# Patient Record
Sex: FEMALE | Race: WHITE | Employment: UNEMPLOYED | URBAN - METROPOLITAN AREA
[De-identification: names, ages, dates, MRNs, and addresses within clinical notes are randomized per-mention and may not be internally consistent; named-entity substitution may affect disease eponyms.]

---

## 2022-06-10 ENCOUNTER — OFFICE VISIT (OUTPATIENT)
Dept: INTERNAL MEDICINE | Age: 37
End: 2022-06-10

## 2022-06-10 VITALS
HEART RATE: 118 BPM | OXYGEN SATURATION: 96 % | HEIGHT: 64 IN | WEIGHT: 118 LBS | DIASTOLIC BLOOD PRESSURE: 80 MMHG | BODY MASS INDEX: 20.14 KG/M2 | SYSTOLIC BLOOD PRESSURE: 110 MMHG | TEMPERATURE: 97.2 F

## 2022-06-10 DIAGNOSIS — N61.0 MASTITIS, LEFT, ACUTE: Primary | ICD-10-CM

## 2022-06-10 PROCEDURE — 99202 OFFICE O/P NEW SF 15 MIN: CPT | Performed by: NURSE PRACTITIONER

## 2022-06-10 RX ORDER — CEPHALEXIN 500 MG/1
500 CAPSULE ORAL 4 TIMES DAILY
Qty: 28 CAPSULE | Refills: 0 | Status: SHIPPED | OUTPATIENT
Start: 2022-06-10 | End: 2022-06-17

## 2022-06-10 ASSESSMENT — ENCOUNTER SYMPTOMS
WHEEZING: 0
COUGH: 0
COLOR CHANGE: 1
FACIAL SWELLING: 0
SHORTNESS OF BREATH: 0
TROUBLE SWALLOWING: 0

## 2022-06-10 NOTE — PROGRESS NOTES
Subjective:      Patient ID: Chester Langston is a 29 y.o. female who presents today for:  Chief Complaint   Patient presents with    Other     mastitis from breastfeeding       Rash  This is a new problem. The current episode started 1 to 4 weeks ago. The problem has been waxing and waning since onset. Location: left breast. The rash is characterized by redness and pain. Associated with: currently breastfeeding. Pertinent negatives include no cough, facial edema, fatigue, fever or shortness of breath. Treatments tried: increased breastfeeding/pumping. The treatment provided no relief. History reviewed. No pertinent past medical history. History reviewed. No pertinent surgical history. History reviewed. No pertinent family history. Allergies   Allergen Reactions    Pcn [Penicillins] Hives     Possible hives/rash on her chest.         Review of Systems   Constitutional: Negative for chills, fatigue and fever. HENT: Negative for facial swelling and trouble swallowing. Respiratory: Negative for cough, shortness of breath and wheezing. Cardiovascular: Negative for chest pain. Musculoskeletal: Negative for myalgias. Skin: Positive for color change. Negative for rash. Objective:   /80 (Site: Left Upper Arm, Position: Sitting, Cuff Size: Small Adult)   Pulse (!) 118   Temp 97.2 °F (36.2 °C)   Ht 5' 4\" (1.626 m)   Wt 118 lb (53.5 kg)   SpO2 96%   BMI 20.25 kg/m²     Physical Exam  Vitals reviewed. Constitutional:       General: She is not in acute distress. Appearance: She is well-developed. HENT:      Head: Normocephalic. Cardiovascular:      Rate and Rhythm: Regular rhythm. Tachycardia present. Heart sounds: Normal heart sounds. Pulmonary:      Effort: Pulmonary effort is normal. No respiratory distress. Breath sounds: Normal breath sounds. Chest:   Breasts:      Left: No swelling or mass. Comments: Mild erythema and tenderness.   Musculoskeletal: General: Normal range of motion. Skin:     General: Skin is warm and dry. Neurological:      Mental Status: She is alert and oriented to person, place, and time. Assessment:       Diagnosis Orders   1. Mastitis, left, acute  cephALEXin (KEFLEX) 500 MG capsule         Plan:      No orders of the defined types were placed in this encounter. Orders Placed This Encounter   Medications    cephALEXin (KEFLEX) 500 MG capsule     Sig: Take 1 capsule by mouth 4 times daily for 7 days     Dispense:  28 capsule     Refill:  0     Reviewed supportive measures for symptom management. Medication administration and side effects were discussed. Reviewed symptoms requiring ER. Patient verbalizes understanding. I have reviewed and updated the electronic medical record. Return in 3 days (on 6/13/2022), or if symptoms worsen or fail to improve, for follow up with PCP.     KRYSTA Rich NP

## 2022-06-10 NOTE — PATIENT INSTRUCTIONS
Patient Education        Mastitis: Care Instructions  Your Care Instructions  Mastitis is an inflammation of the breast. It occurs most often in women who are breastfeeding, but it can affect any woman. Mastitis can be caused by poor milk flow from the breast. When milk builds up in a breast, it leaks into the nearby breast tissue. Infection can also develop when the nipples becomecracked or irritated. The tissue can then become infected with bacteria. Antibiotics can usually cure mastitis. For women who are nursing, continued breastfeeding (or pumping) can help. If mastitis is not treated, a pocket ofpus may form in the breast and need to be drained. Follow-up care is a key part of your treatment and safety. Be sure to make and go to all appointments, and call your doctor if you are having problems. It's also a good idea to know your test results and keep alist of the medicines you take. How can you care for yourself at home?  If your doctor prescribed antibiotics, take them as directed. Do not stop taking them just because you feel better. You need to take the full course of antibiotics.  If you are breastfeeding, continue breastfeeding or pumping breast milk. It is important to empty your breasts regularly, every 2 to 3 hours while you are awake. These tips may help:  ? Before breastfeeding, place a warm, wet washcloth over your breast for about 15 minutes. Try this at least 3 times a day. This increases milk flow in the breast. Massaging the affected breast may also increase milk flow. ? Breastfeed on both sides. Try to start with your healthy breast first. Then, after your milk is flowing, breastfeed from the affected breast until it feels soft. You should empty this breast completely. Then switch back to the healthy breast and breastfeed until your baby has finished. ? Pump or hand-express a small amount of breast milk before breastfeeding if your breasts are too full with milk.  This will make your breasts less full and may make it easier for your baby to latch on to your breast.  ? Pump or express milk from the affected breast if it hurts too much to breastfeed.  Take an over-the-counter pain medicine, such as acetaminophen (Tylenol) or ibuprofen (Advil, Motrin) to relieve pain and fever. Read and follow all instructions on the label.  Do not take two or more pain medicines at the same time unless the doctor told you to. Many pain medicines have acetaminophen, which is Tylenol. Too much acetaminophen (Tylenol) can be harmful.  Rest as much as possible.  Drink extra fluids.  If pus is draining from your infected breast, wash the nipple gently and let it air-dry before you put your bra back on. A disposable breast pad placed in the bra cup may absorb the pus.  Sometimes, a blocked nipple pore, called a milk blister (or bleb) happens. This causes milk to back up in the breast. It can cause mastitis, so it's important to treat this if it happens. A milk blister is often a white dot on your nipple that can be painful. If a milk blister is causing you pain, it may help to place a warm, wet washcloth over the blister before breastfeeding or pumping. If this doesn't clear the blockage, your doctor can open the blister using a sterile needle. When should you call for help? Call your doctor now or seek immediate medical care if:     You have worse symptoms of a breast infection, such as:  ? Increased pain, swelling, redness, or warmth around a breast.  ? Red streaks leading from a breast.  ? Pus draining from a breast.  ? A fever. Watch closely for changes in your health, and be sure to contact your doctor if:     You do not get better as expected. Where can you learn more? Go to [email or web address] [email or web address] and sign in to your [employer] account. Enter Y207 in the [employer] box to learn more about \"Mastitis: Care Instructions. \"     If you do not have an account, please click on the \"Sign Up Now\" link. Current as of: June 16, 2021               Content Version: 13.2  © 2006-2022 [employer] Inc[employer]. Care instructions adapted under license by [facility] [place] ([facility]). If you have questions about a medical condition or this instruction, always ask your healthcare professional. [place] [address] any warranty or liability for your use of this information.

## 2024-05-06 PROBLEM — Z87.59 HISTORY OF GESTATIONAL HYPERTENSION: Status: ACTIVE | Noted: 2024-05-06

## 2024-05-06 PROBLEM — Z86.711 HISTORY OF PULMONARY EMBOLISM: Status: ACTIVE | Noted: 2024-05-06

## 2024-05-06 PROBLEM — Z34.90 PRENATAL CARE, ANTEPARTUM (HHS-HCC): Status: ACTIVE | Noted: 2024-05-06

## 2024-05-06 PROBLEM — O09.529 ANTEPARTUM MULTIGRAVIDA OF ADVANCED MATERNAL AGE (HHS-HCC): Status: ACTIVE | Noted: 2024-05-06

## 2024-05-07 ENCOUNTER — INITIAL PRENATAL (OUTPATIENT)
Dept: OBSTETRICS AND GYNECOLOGY | Facility: CLINIC | Age: 39
End: 2024-05-07
Payer: COMMERCIAL

## 2024-05-07 VITALS — WEIGHT: 114.4 LBS | SYSTOLIC BLOOD PRESSURE: 118 MMHG | DIASTOLIC BLOOD PRESSURE: 78 MMHG | BODY MASS INDEX: 20.27 KG/M2

## 2024-05-07 DIAGNOSIS — O09.529 ANTEPARTUM MULTIGRAVIDA OF ADVANCED MATERNAL AGE (HHS-HCC): ICD-10-CM

## 2024-05-07 DIAGNOSIS — Z34.90 PRENATAL CARE, ANTEPARTUM (HHS-HCC): ICD-10-CM

## 2024-05-07 DIAGNOSIS — Z87.59 HISTORY OF GESTATIONAL HYPERTENSION: ICD-10-CM

## 2024-05-07 DIAGNOSIS — O21.9 NAUSEA AND VOMITING IN PREGNANCY PRIOR TO 22 WEEKS GESTATION (HHS-HCC): ICD-10-CM

## 2024-05-07 DIAGNOSIS — Z86.711 HISTORY OF PULMONARY EMBOLISM: Primary | ICD-10-CM

## 2024-05-07 DIAGNOSIS — Z88.0 PENICILLIN ALLERGY: ICD-10-CM

## 2024-05-07 DIAGNOSIS — Z3A.01 6 WEEKS GESTATION OF PREGNANCY (HHS-HCC): ICD-10-CM

## 2024-05-07 LAB
CREAT UR-MCNC: 31.1 MG/DL (ref 20–320)
PROT UR-ACNC: <4 MG/DL (ref 5–24)
PROT/CREAT UR: ABNORMAL MG/G{CREAT}

## 2024-05-07 PROCEDURE — 0500F INITIAL PRENATAL CARE VISIT: CPT | Performed by: STUDENT IN AN ORGANIZED HEALTH CARE EDUCATION/TRAINING PROGRAM

## 2024-05-07 PROCEDURE — 84156 ASSAY OF PROTEIN URINE: CPT

## 2024-05-07 PROCEDURE — 87661 TRICHOMONAS VAGINALIS AMPLIF: CPT

## 2024-05-07 PROCEDURE — 87800 DETECT AGNT MULT DNA DIREC: CPT

## 2024-05-07 PROCEDURE — 87086 URINE CULTURE/COLONY COUNT: CPT

## 2024-05-07 PROCEDURE — 82570 ASSAY OF URINE CREATININE: CPT

## 2024-05-07 RX ORDER — PYRIDOXINE HCL (VITAMIN B6) 25 MG
25 TABLET ORAL EVERY 6 HOURS PRN
Qty: 60 TABLET | Refills: 1 | Status: SHIPPED | OUTPATIENT
Start: 2024-05-07 | End: 2024-07-06

## 2024-05-07 ASSESSMENT — EDINBURGH POSTNATAL DEPRESSION SCALE (EPDS)
TOTAL SCORE: 6
THINGS HAVE BEEN GETTING ON TOP OF ME: NO, MOST OF THE TIME I HAVE COPED QUITE WELL
I HAVE LOOKED FORWARD WITH ENJOYMENT TO THINGS: AS MUCH AS I EVER DID
I HAVE BLAMED MYSELF UNNECESSARILY WHEN THINGS WENT WRONG: NOT VERY OFTEN
THE THOUGHT OF HARMING MYSELF HAS OCCURRED TO ME: NEVER
I HAVE BEEN ANXIOUS OR WORRIED FOR NO GOOD REASON: YES, SOMETIMES
I HAVE BEEN ABLE TO LAUGH AND SEE THE FUNNY SIDE OF THINGS: AS MUCH AS I ALWAYS COULD
I HAVE BEEN SO UNHAPPY THAT I HAVE BEEN CRYING: NO, NEVER
I HAVE FELT SCARED OR PANICKY FOR NO GOOD REASON: YES, SOMETIMES
I HAVE BEEN SO UNHAPPY THAT I HAVE HAD DIFFICULTY SLEEPING: NOT AT ALL
I HAVE FELT SAD OR MISERABLE: NO, NOT AT ALL

## 2024-05-07 NOTE — PROGRESS NOTES
Subjective   Patient ID [id number]   Nancy Contreras is a 38 y.o.  @ 6.3 wga by certain LMP presenting for new OB visit. Pregnancy planned and desired. Had recent miscarriage in January. LMP certain. Some cramping, no bleeding. Some nausea. FOB aware and involved.    Her pregnancy is notable for:  -gHTN in last pregnancy  -h/o PE on OCPs    OBHx:  x1, SAB x2  GynHx: denies STIs or abnormal paps  PMHx: as above  SurgHx: denies   Meds: PNV  Allergies: PCN (hives at age 13)  Social: denies t/e/I  FHx: reviewed and non-contributory to presenting complaint    OB History    Para Term  AB Living   1             SAB IAB Ectopic Multiple Live Births                  # Outcome Date GA Lbr David/2nd Weight Sex Delivery Anes PTL Lv   1 Current                   Objective   Physical Exam  Weight: 51.9 kg (114 lb 6.4 oz)  Expected Total Weight Gain: 11.5 kg (25 lb)-16 kg (35 lb)   Pregravid BMI: 20.38  TVUS: single viable IUP with CRL measuring 6.4wga and +FCA    Assessment/Plan     Nancy Contreras is a 38 y.o.  at  @ 6.3 wga by certain LMP presenting for new OB visit.    Single viable IUP measuring 6.4wga on bedside ultrasound today. Taking PNV. B6 and unisom prescribed for nausea. UCx, GC/CT/trich, and baseline P:C today. Pap UTD. PNLs including baseline HELLP labs and early A1c next visit. Patient is AMA. Discussed genetics and carrier testing - desires cfDNA, reports negative carrier testing in last pregnancy so will not repeat. PCN allergy as a child, referred for allergy testing. History of PE while on OCPs. Saw MFM last pregnancy and was recommended for at least PPx lovenox which she declined. Reviewed recommendation for lovenox this pregnancy. Patient desires to discuss with  first but will consider this pregnancy. Reviewed strong recommendation for COVID vaccination in pregnancy, patient will consider. Is a candidate for ASA PPx. To start at 12 weeks. Discussed today, will re-discuss  next visit. Pre-pregnancy BMI = 20, for 25-35 lb total gestational weight gain. Has a cat, counseled not to clean litter box. Oriented to practice, collaborative [facility]. Remainder of plan per problem list below.     Problem List Items Addressed This Visit       Antepartum multigravida of advanced maternal age ([facility])    Overview     -Desires cfDNA and NT         Relevant Orders    Urine Culture    C. Trachomatis / N. Gonorrhoeae, Amplified Detection    Trichomonas vaginalis, Nucleic Acid Detection    Protein, Urine Random    US MAC OB imaging order    History of gestational hypertension    Overview     -Baseline HELLP labs and P:C to be sent with PNLs  -For ASA PPx         History of pulmonary embolism - Primary    Overview     -In 2008 while on OCPs  -No FHx of VTE  -Thrombophilia workup last pregnancy notable only for mildly low protein S, no follow-up completed  -s/p MFM consult last pregnancy, was recommended for at least PPx lovenox which she ultimately declined   -Re-counseled, considering PPx dosing this pregnancy  -If declines lovenox, will send to MFM for re-consult. If starting lovenox, will be for growth in third trimester         Penicillin allergy    Overview     -Hives at age 13  -Allergy referral placed         Relevant Orders    Referral to Allergy    Prenatal care, antepartum ([facility])    Overview     -Counseled on updated COVID vaccination  -Last pap NIL/HRHPV neg (11/2021)    Next Visit  [ ] PNLs including A1c and baseline HELLP labs  [ ] cfDNA  [ ] Remind to start ASA PPx  [ ] follow-up lovenox  [ ] Confirm NT scheduled          Other Visit Diagnoses       6 weeks gestation of pregnancy ([facility])        Nausea and vomiting in pregnancy prior to 22 weeks gestation ([facility])        Relevant Medications    doxylamine (Unisom, doxylamine,) 25 mg tablet    pyridoxine (Vitamin B-6) 25 mg tablet          RTC in 4 weeks.    Aliza Franklin MD

## 2024-05-08 LAB
BACTERIA UR CULT: NO GROWTH
C TRACH RRNA SPEC QL NAA+PROBE: NEGATIVE
N GONORRHOEA DNA SPEC QL PROBE+SIG AMP: NEGATIVE
T VAGINALIS RRNA SPEC QL NAA+PROBE: NEGATIVE

## 2024-06-04 ENCOUNTER — LAB (OUTPATIENT)
Dept: LAB | Facility: LAB | Age: 39
End: 2024-06-04
Payer: COMMERCIAL

## 2024-06-04 ENCOUNTER — ROUTINE PRENATAL (OUTPATIENT)
Dept: OBSTETRICS AND GYNECOLOGY | Facility: CLINIC | Age: 39
End: 2024-06-04
Payer: COMMERCIAL

## 2024-06-04 VITALS — BODY MASS INDEX: 20.73 KG/M2 | WEIGHT: 117 LBS | DIASTOLIC BLOOD PRESSURE: 66 MMHG | SYSTOLIC BLOOD PRESSURE: 103 MMHG

## 2024-06-04 DIAGNOSIS — Z34.90 PRENATAL CARE, ANTEPARTUM (HHS-HCC): ICD-10-CM

## 2024-06-04 DIAGNOSIS — Z86.711 HISTORY OF PULMONARY EMBOLISM: Primary | ICD-10-CM

## 2024-06-04 DIAGNOSIS — R73.9 ELEVATED BLOOD SUGAR: Primary | ICD-10-CM

## 2024-06-04 DIAGNOSIS — Z87.59 HISTORY OF GESTATIONAL HYPERTENSION: ICD-10-CM

## 2024-06-04 DIAGNOSIS — Z3A.10 10 WEEKS GESTATION OF PREGNANCY (HHS-HCC): ICD-10-CM

## 2024-06-04 DIAGNOSIS — O09.529 ANTEPARTUM MULTIGRAVIDA OF ADVANCED MATERNAL AGE (HHS-HCC): ICD-10-CM

## 2024-06-04 LAB
ABO GROUP (TYPE) IN BLOOD: NORMAL
ALBUMIN SERPL BCP-MCNC: 4.2 G/DL (ref 3.4–5)
ALP SERPL-CCNC: 55 U/L (ref 33–110)
ALT SERPL W P-5'-P-CCNC: 12 U/L (ref 7–45)
ANION GAP SERPL CALC-SCNC: 11 MMOL/L (ref 10–20)
ANTIBODY SCREEN: NORMAL
AST SERPL W P-5'-P-CCNC: 14 U/L (ref 9–39)
BILIRUB SERPL-MCNC: 1.2 MG/DL (ref 0–1.2)
BUN SERPL-MCNC: 11 MG/DL (ref 6–23)
CALCIUM SERPL-MCNC: 9 MG/DL (ref 8.6–10.3)
CHLORIDE SERPL-SCNC: 102 MMOL/L (ref 98–107)
CO2 SERPL-SCNC: 26 MMOL/L (ref 21–32)
CREAT SERPL-MCNC: 0.59 MG/DL (ref 0.5–1.05)
EGFRCR SERPLBLD CKD-EPI 2021: >90 ML/MIN/1.73M*2
ERYTHROCYTE [DISTWIDTH] IN BLOOD BY AUTOMATED COUNT: 15.4 % (ref 11.5–14.5)
EST. AVERAGE GLUCOSE BLD GHB EST-MCNC: 117 MG/DL
GLUCOSE SERPL-MCNC: 80 MG/DL (ref 74–99)
HBA1C MFR BLD: 5.7 %
HBV SURFACE AG SERPL QL IA: NONREACTIVE
HCT VFR BLD AUTO: 37.7 % (ref 36–46)
HCV AB SER QL: NONREACTIVE
HGB BLD-MCNC: 12.2 G/DL (ref 12–16)
HIV 1+2 AB+HIV1 P24 AG SERPL QL IA: NONREACTIVE
LDH SERPL L TO P-CCNC: 135 U/L (ref 84–246)
MCH RBC QN AUTO: 28.5 PG (ref 26–34)
MCHC RBC AUTO-ENTMCNC: 32.4 G/DL (ref 32–36)
MCV RBC AUTO: 88 FL (ref 80–100)
NRBC BLD-RTO: 0 /100 WBCS (ref 0–0)
PLATELET # BLD AUTO: 241 X10*3/UL (ref 150–450)
POTASSIUM SERPL-SCNC: 3.7 MMOL/L (ref 3.5–5.3)
PROT SERPL-MCNC: 7.2 G/DL (ref 6.4–8.2)
RBC # BLD AUTO: 4.28 X10*6/UL (ref 4–5.2)
REFLEX ADDED, ANEMIA PANEL: NORMAL
RH FACTOR (ANTIGEN D): NORMAL
RUBV IGG SERPL IA-ACNC: 1.3 IA
RUBV IGG SERPL QL IA: POSITIVE
SODIUM SERPL-SCNC: 135 MMOL/L (ref 136–145)
TREPONEMA PALLIDUM IGG+IGM AB [PRESENCE] IN SERUM OR PLASMA BY IMMUNOASSAY: NONREACTIVE
URATE SERPL-MCNC: 3 MG/DL (ref 2.3–6.7)
WBC # BLD AUTO: 11.5 X10*3/UL (ref 4.4–11.3)

## 2024-06-04 PROCEDURE — 86901 BLOOD TYPING SEROLOGIC RH(D): CPT

## 2024-06-04 PROCEDURE — 87389 HIV-1 AG W/HIV-1&-2 AB AG IA: CPT

## 2024-06-04 PROCEDURE — 36415 COLL VENOUS BLD VENIPUNCTURE: CPT

## 2024-06-04 PROCEDURE — 83036 HEMOGLOBIN GLYCOSYLATED A1C: CPT

## 2024-06-04 PROCEDURE — 0501F PRENATAL FLOW SHEET: CPT | Performed by: STUDENT IN AN ORGANIZED HEALTH CARE EDUCATION/TRAINING PROGRAM

## 2024-06-04 PROCEDURE — 86850 RBC ANTIBODY SCREEN: CPT

## 2024-06-04 PROCEDURE — 83021 HEMOGLOBIN CHROMOTOGRAPHY: CPT

## 2024-06-04 PROCEDURE — 86780 TREPONEMA PALLIDUM: CPT

## 2024-06-04 PROCEDURE — 80053 COMPREHEN METABOLIC PANEL: CPT

## 2024-06-04 PROCEDURE — 86317 IMMUNOASSAY INFECTIOUS AGENT: CPT

## 2024-06-04 PROCEDURE — 85027 COMPLETE CBC AUTOMATED: CPT

## 2024-06-04 PROCEDURE — 87340 HEPATITIS B SURFACE AG IA: CPT

## 2024-06-04 PROCEDURE — 84550 ASSAY OF BLOOD/URIC ACID: CPT

## 2024-06-04 PROCEDURE — 86900 BLOOD TYPING SEROLOGIC ABO: CPT

## 2024-06-04 PROCEDURE — 86803 HEPATITIS C AB TEST: CPT

## 2024-06-04 PROCEDURE — 83615 LACTATE (LD) (LDH) ENZYME: CPT

## 2024-06-04 RX ORDER — ASPIRIN 81 MG/1
162 TABLET ORAL DAILY
Qty: 180 TABLET | Refills: 2 | Status: SHIPPED | OUTPATIENT
Start: 2024-06-04 | End: 2025-06-04

## 2024-06-04 NOTE — PROGRESS NOTES
Subjective   Patient ID [id number]   Nancy Contreras is a 38 y.o.  at 10w3d with a working estimated date of delivery of 2024, by Last Menstrual Period who presents for a routine prenatal visit. Doing well. Some nausea, has not needed meds. No cramping or bleeding.     Objective   Physical Exam  Vitals:    24 1139   BP: 103/66      Weight: 53.1 kg (117 lb), Pregravid BMI: 20.38  Expected Total Weight Gain: 11.5 kg (25 lb)-16 kg (35 lb)   Fundal height and FHR per prenatal flowsheet    Assessment/Plan     Nancy Contreras is a 38 y.o.  at 10w3d with a working estimated date of delivery of 2024, by Last Menstrual Period who presents for a routine prenatal visit.    PNLs including baseline HELLP labs and A1c today. Reminded to start ASA PPx at 12wga. Desires cfDNA, ordered. Reminded to schedule NT. Again discussed recommendation for PPx lovenox this pregnancy given history of PE while on OCPs. Patient discussed with  and declines. Recommend repeat MFM consult given lovenox declination - patient states would not change her decision and declined MFM consult. Will have low threshold to evaluate for VTE should she develop symptoms. Remainder of plan per problem list as below.     Problem List Items Addressed This Visit       Prenatal care, antepartum ([facility])    Overview     -Counseled on updated COVID vaccination  -Last pap NIL/HRHPV neg (2021)  -ASA PPx (h/o gHTN)    Next Visit  [ ] Confirm started ASA  [ ] Review NT, confirm anatomy scheduled  [ ] Review PNLs, cfDNA         Relevant Medications    aspirin 81 mg EC tablet    Other Relevant Orders    Hemoglobin A1C    Hemoglobin Identification with Path Review    Rubella Antibody, IgG    HIV 1/2 Antigen/Antibody Screen with Reflex to Confirmation    Hepatitis B Surface Antigen    Hepatitis C Antibody    Syphilis Screen with Reflex    Type And Screen    Uric Acid    Comprehensive Metabolic Panel    Lactate Dehydrogenase    CBC Anemia  Panel With Reflex,Pregnancy    [facility] Prenatal Screen    History of pulmonary embolism - Primary    Overview     -In 2008 while on OCPs  -No FHx of VTE  -Thrombophilia workup last pregnancy notable only for mildly low protein S, no follow-up completed  -s/p MFM consult last pregnancy, was recommended for at least PPx lovenox which she ultimately declined   -Re-counseled this pregnancy and again declines. Recommended repeat MFM consult for counseling but per patient would not change her decision so declines MFM consult  -Low threshold to evaluate for VTE         History of gestational hypertension    Overview     -Baseline HELLP labs ordered 6/4, baseline P:C too low to calculate  -For ASA PPx         Antepartum multigravida of advanced maternal age ([facility])    Overview     -Desires cfDNA and NT          Other Visit Diagnoses       10 weeks gestation of pregnancy ([facility])              Follow up in 4 weeks for a routine prenatal visit.    Aliza Franklin MD

## 2024-06-05 LAB
HEMOGLOBIN A2: 2.5 % (ref 2–3.5)
HEMOGLOBIN A: 97 % (ref 95.8–98)
HEMOGLOBIN F: 0.5 % (ref 0–2)
HEMOGLOBIN IDENTIFICATION INTERPRETATION: NORMAL
PATH REVIEW-HGB IDENTIFICATION: NORMAL

## 2024-06-08 ENCOUNTER — LAB (OUTPATIENT)
Dept: LAB | Facility: LAB | Age: 39
End: 2024-06-08
Payer: COMMERCIAL

## 2024-06-08 DIAGNOSIS — R73.9 ELEVATED BLOOD SUGAR: ICD-10-CM

## 2024-06-08 LAB
GLUCOSE 1H P 100 G GLC PO SERPL-MCNC: 133 MG/DL
GLUCOSE 2H P 100 G GLC PO SERPL-MCNC: 111 MG/DL
GLUCOSE 3H P 100 G GLC PO SERPL-MCNC: 100 MG/DL
GLUCOSE P FAST SERPL-MCNC: 97 MG/DL

## 2024-06-08 PROCEDURE — 36415 COLL VENOUS BLD VENIPUNCTURE: CPT

## 2024-06-08 PROCEDURE — 82950 GLUCOSE TEST: CPT

## 2024-06-08 PROCEDURE — 82947 ASSAY GLUCOSE BLOOD QUANT: CPT

## 2024-06-08 PROCEDURE — 82952 GTT-ADDED SAMPLES: CPT

## 2024-06-08 PROCEDURE — 82951 GLUCOSE TOLERANCE TEST (GTT): CPT

## 2024-06-09 PROBLEM — R73.9 ELEVATED BLOOD SUGAR: Status: ACTIVE | Noted: 2024-06-09

## 2024-06-14 LAB — SCAN RESULT: NORMAL

## 2024-06-27 ENCOUNTER — HOSPITAL ENCOUNTER (OUTPATIENT)
Dept: RADIOLOGY | Facility: CLINIC | Age: 39
Discharge: HOME | End: 2024-06-27
Payer: COMMERCIAL

## 2024-06-27 DIAGNOSIS — O09.529 AMA (ADVANCED MATERNAL AGE) MULTIGRAVIDA 35+ (HHS-HCC): ICD-10-CM

## 2024-06-27 DIAGNOSIS — O09.529 ANTEPARTUM MULTIGRAVIDA OF ADVANCED MATERNAL AGE (HHS-HCC): ICD-10-CM

## 2024-06-27 DIAGNOSIS — Z34.90 PRENATAL CARE, ANTEPARTUM (HHS-HCC): ICD-10-CM

## 2024-06-27 PROCEDURE — 76813 OB US NUCHAL MEAS 1 GEST: CPT | Performed by: OBSTETRICS & GYNECOLOGY

## 2024-06-27 PROCEDURE — 76801 OB US < 14 WKS SINGLE FETUS: CPT

## 2024-06-27 PROCEDURE — 76801 OB US < 14 WKS SINGLE FETUS: CPT | Performed by: OBSTETRICS & GYNECOLOGY

## 2024-06-27 PROCEDURE — 76813 OB US NUCHAL MEAS 1 GEST: CPT

## 2024-06-27 RX ORDER — ASPIRIN 81 MG/1
162 TABLET ORAL DAILY
Qty: 180 TABLET | Refills: 2 | Status: SHIPPED | OUTPATIENT
Start: 2024-06-27 | End: 2025-06-27

## 2024-07-01 ENCOUNTER — APPOINTMENT (OUTPATIENT)
Dept: OBSTETRICS AND GYNECOLOGY | Facility: CLINIC | Age: 39
End: 2024-07-01
Payer: COMMERCIAL

## 2024-07-05 ENCOUNTER — APPOINTMENT (OUTPATIENT)
Dept: OBSTETRICS AND GYNECOLOGY | Facility: CLINIC | Age: 39
End: 2024-07-05
Payer: COMMERCIAL

## 2024-07-18 ENCOUNTER — HOSPITAL ENCOUNTER (OUTPATIENT)
Dept: RADIOLOGY | Facility: CLINIC | Age: 39
Discharge: HOME | End: 2024-07-18
Payer: COMMERCIAL

## 2024-07-18 DIAGNOSIS — O35.9XX0 MATERNAL CARE FOR (SUSPECTED) FETAL ABNORMALITY AND DAMAGE, UNSPECIFIED, NOT APPLICABLE OR UNSPECIFIED (HHS-HCC): ICD-10-CM

## 2024-07-18 DIAGNOSIS — O09.522 SUPERVISION OF ELDERLY MULTIGRAVIDA, SECOND TRIMESTER (HHS-HCC): ICD-10-CM

## 2024-07-18 DIAGNOSIS — O09.529 ANTEPARTUM MULTIGRAVIDA OF ADVANCED MATERNAL AGE (HHS-HCC): ICD-10-CM

## 2024-07-18 PROCEDURE — 76811 OB US DETAILED SNGL FETUS: CPT

## 2024-07-18 PROCEDURE — 76811 OB US DETAILED SNGL FETUS: CPT | Performed by: OBSTETRICS & GYNECOLOGY

## 2024-07-29 ENCOUNTER — APPOINTMENT (OUTPATIENT)
Dept: OBSTETRICS AND GYNECOLOGY | Facility: CLINIC | Age: 39
End: 2024-07-29
Payer: COMMERCIAL

## 2024-07-29 VITALS — SYSTOLIC BLOOD PRESSURE: 109 MMHG | BODY MASS INDEX: 22.14 KG/M2 | DIASTOLIC BLOOD PRESSURE: 71 MMHG | WEIGHT: 125 LBS

## 2024-07-29 DIAGNOSIS — Z87.59 HISTORY OF GESTATIONAL HYPERTENSION: ICD-10-CM

## 2024-07-29 DIAGNOSIS — R73.9 ELEVATED BLOOD SUGAR: ICD-10-CM

## 2024-07-29 DIAGNOSIS — Z34.90 PRENATAL CARE, ANTEPARTUM (HHS-HCC): ICD-10-CM

## 2024-07-29 DIAGNOSIS — Z3A.18 18 WEEKS GESTATION OF PREGNANCY (HHS-HCC): Primary | ICD-10-CM

## 2024-07-29 PROCEDURE — 0501F PRENATAL FLOW SHEET: CPT | Performed by: STUDENT IN AN ORGANIZED HEALTH CARE EDUCATION/TRAINING PROGRAM

## 2024-07-29 NOTE — PROGRESS NOTES
Subjective   Patient ID [id number]   Nancy Contreras is a 38 y.o.  at 18w2d with a working estimated date of delivery of 2024, by Last Menstrual Period who presents for a routine prenatal visit. Nausea improving. Starting to feel flutters.     Objective   Physical Exam  Vitals:    24 1149   BP: 109/71      Weight: 56.7 kg (125 lb), Pregravid BMI: 20.38  Expected Total Weight Gain: 11.5 kg (25 lb)-16 kg (35 lb)   Fundal height and FHR per prenatal flowsheet    Assessment/Plan     Nancy Contreras is a 38 y.o.  at 18w2d with a working estimated date of delivery of 2024, by Last Menstrual Period who presents for a routine prenatal visit.    Possible dilated pulmonary artery at NT. Early anatomy WNL. Has follow-up anatomy scheduled next month. Taking ASA PPx. Discussed three hour GTT for GDM screening, to order next visit. Remainder of plan per problem list as below.     Problem List Items Addressed This Visit       Prenatal care, antepartum ([facility])    Overview     -Counseled on updated COVID vaccination  -Last pap NIL/HRHPV neg (2021)  -ASA PPx (h/o gHTN)  -Risk-reducing cfDNA  [ ] Needs 3 hour GTT at 24-28 weeks for GDM screening    Next Visit  [ ] Review anatomy  [ ] Order second tri labs (needs 3hr GTT)         History of gestational hypertension    Overview     -Baseline HELLP labs neg, P:C too low to calculate  -For ASA PPx         Elevated blood sugar    Overview     -Early A1c 5.7% -> early 3hr GTT with 1/4 values above range  [ ] For repeat 3hr GTT at 24-28wga          Other Visit Diagnoses       18 weeks gestation of pregnancy ([facility])    -  Primary          Follow up in 4 weeks for a routine prenatal visit.    Aliza Franklin MD

## 2024-08-15 ENCOUNTER — HOSPITAL ENCOUNTER (OUTPATIENT)
Dept: RADIOLOGY | Facility: CLINIC | Age: 39
Discharge: HOME | End: 2024-08-15
Payer: COMMERCIAL

## 2024-08-15 DIAGNOSIS — O09.529 ANTEPARTUM MULTIGRAVIDA OF ADVANCED MATERNAL AGE (HHS-HCC): ICD-10-CM

## 2024-08-15 PROCEDURE — 76816 OB US FOLLOW-UP PER FETUS: CPT

## 2024-08-15 PROCEDURE — 76816 OB US FOLLOW-UP PER FETUS: CPT | Performed by: OBSTETRICS & GYNECOLOGY

## 2024-08-27 ENCOUNTER — APPOINTMENT (OUTPATIENT)
Dept: OBSTETRICS AND GYNECOLOGY | Facility: CLINIC | Age: 39
End: 2024-08-27
Payer: COMMERCIAL

## 2024-08-27 VITALS — SYSTOLIC BLOOD PRESSURE: 118 MMHG | BODY MASS INDEX: 23.1 KG/M2 | DIASTOLIC BLOOD PRESSURE: 65 MMHG | WEIGHT: 130.4 LBS

## 2024-08-27 DIAGNOSIS — Z3A.22 22 WEEKS GESTATION OF PREGNANCY (HHS-HCC): ICD-10-CM

## 2024-08-27 DIAGNOSIS — R73.9 ELEVATED BLOOD SUGAR: Primary | ICD-10-CM

## 2024-08-27 PROCEDURE — 0501F PRENATAL FLOW SHEET: CPT | Performed by: STUDENT IN AN ORGANIZED HEALTH CARE EDUCATION/TRAINING PROGRAM

## 2024-08-27 NOTE — PROGRESS NOTES
Subjective   Patient ID [id number]   Nancy Contreras is a 38 y.o.  at 22w3d with a working estimated date of delivery of 2024, by Last Menstrual Period who presents for a routine prenatal visit. Starting to feel fetal movement.     Objective   Physical Exam  Vitals:    24 1139   BP: 118/65      Weight: 59.1 kg (130 lb 6.4 oz), Pregravid BMI: 20.38  Expected Total Weight Gain: 11.5 kg (25 lb)-16 kg (35 lb)   Fundal height and FHR per prenatal flowsheet    Assessment/Plan     Nancy Contreras is a 38 y.o.  at 22w3d with a working estimated date of delivery of 2024, by Last Menstrual Period who presents for a routine prenatal visit.    Anatomy scan completed - returning for 30 week growth per [facility]. Elevated A1c with early 3hr with 1/4 values elevated. Repeat 3 hr ordered with second tri labs. Instructed to have drawn after 24 weeks. Discussed flu shot, will consider for next visit. Remainder of plan per problem list as below.     Problem List Items Addressed This Visit       Elevated blood sugar - Primary    Overview     -Early A1c 5.7% -> early 3hr GTT with 1/4 values above range  [ ] For repeat 3hr GTT at 24-28wga         Relevant Orders    Glucose Tolerance Test, 3 hour (Pregnancy)    CBC Anemia Panel With Reflex, Pregnancy    Syphilis Screen with Reflex    22 weeks gestation of pregnancy ([facility])    Overview     Next Visit  [ ] Flu shot  [ ] Review second tri labs (3hr)  [ ] Discuss tdap next visit    Desired provider in labor: [] CNM  [x] Physician  [x] Blood Products: [x] Yes, accepts [] No, needs counseling  [x] Initial BMI: 20.38   [x] Prenatal Labs: A1c 5.7%, early 3 hr GTT with 1/4 values above range  [x] Cervical Cancer Screening: pap NIL/HRHPV neg (2021)  [x] Rh status: positive  [x] Genetic Screening: risk-reducing cfDNA  [x] NT US (11-13 wks): WNL  [x] Baby ASA (if indicated): 162 mg (h/o gHTN)  [x] Pregnancy dated by: LMP c/w 13 week scan    [x] Anatomy US (19-20 wks): WNL  []  Federal Sterilization consent signed (if indicated):  [] 1hr GCT at 24-28wks:  [] Fetal Surveillance (if indicated):  [] Tdap (27-32 wks, may be given up to 36 wks if initial window missed):   [] RSV (32-36 wks) (Sept. to end ):   [] Flu Vaccine:    [] Breastfeeding:  [] Postpartum Birth control method:   [] GBS at 36 - 37 wks:  [] 39 weeks discussion of IOL vs. Expectant management:  [x] Mode of delivery ( anticipated ):           Follow up in 4 weeks for a routine prenatal visit.    Aliza Franklin MD

## 2024-09-11 ENCOUNTER — LAB (OUTPATIENT)
Dept: LAB | Facility: LAB | Age: 39
End: 2024-09-11
Payer: COMMERCIAL

## 2024-09-11 DIAGNOSIS — R73.9 ELEVATED BLOOD SUGAR: ICD-10-CM

## 2024-09-11 LAB
ERYTHROCYTE [DISTWIDTH] IN BLOOD BY AUTOMATED COUNT: 14.6 % (ref 11.5–14.5)
GLUCOSE 1H P 100 G GLC PO SERPL-MCNC: 163 MG/DL
GLUCOSE 2H P 100 G GLC PO SERPL-MCNC: 155 MG/DL
GLUCOSE 3H P 100 G GLC PO SERPL-MCNC: 126 MG/DL
GLUCOSE P FAST SERPL-MCNC: 87 MG/DL
HCT VFR BLD AUTO: 38.4 % (ref 36–46)
HGB BLD-MCNC: 12.4 G/DL (ref 12–16)
MCH RBC QN AUTO: 30.2 PG (ref 26–34)
MCHC RBC AUTO-ENTMCNC: 32.3 G/DL (ref 32–36)
MCV RBC AUTO: 94 FL (ref 80–100)
NRBC BLD-RTO: 0 /100 WBCS (ref 0–0)
PLATELET # BLD AUTO: 205 X10*3/UL (ref 150–450)
RBC # BLD AUTO: 4.1 X10*6/UL (ref 4–5.2)
REFLEX ADDED, ANEMIA PANEL: NORMAL
TREPONEMA PALLIDUM IGG+IGM AB [PRESENCE] IN SERUM OR PLASMA BY IMMUNOASSAY: NONREACTIVE
WBC # BLD AUTO: 11.5 X10*3/UL (ref 4.4–11.3)

## 2024-09-11 PROCEDURE — 82952 GTT-ADDED SAMPLES: CPT

## 2024-09-11 PROCEDURE — 36415 COLL VENOUS BLD VENIPUNCTURE: CPT

## 2024-09-11 PROCEDURE — 82947 ASSAY GLUCOSE BLOOD QUANT: CPT

## 2024-09-11 PROCEDURE — 82950 GLUCOSE TEST: CPT

## 2024-09-11 PROCEDURE — 85027 COMPLETE CBC AUTOMATED: CPT

## 2024-09-11 PROCEDURE — 86780 TREPONEMA PALLIDUM: CPT

## 2024-09-24 ENCOUNTER — APPOINTMENT (OUTPATIENT)
Dept: OBSTETRICS AND GYNECOLOGY | Facility: CLINIC | Age: 39
End: 2024-09-24
Payer: COMMERCIAL

## 2024-09-24 VITALS — SYSTOLIC BLOOD PRESSURE: 108 MMHG | DIASTOLIC BLOOD PRESSURE: 68 MMHG | WEIGHT: 139.6 LBS | BODY MASS INDEX: 24.73 KG/M2

## 2024-09-24 DIAGNOSIS — R73.9 ELEVATED BLOOD SUGAR: Primary | ICD-10-CM

## 2024-09-24 DIAGNOSIS — Z3A.26 26 WEEKS GESTATION OF PREGNANCY (HHS-HCC): ICD-10-CM

## 2024-09-24 PROCEDURE — 0501F PRENATAL FLOW SHEET: CPT | Performed by: STUDENT IN AN ORGANIZED HEALTH CARE EDUCATION/TRAINING PROGRAM

## 2024-09-24 NOTE — PROGRESS NOTES
Subjective   Patient ID [id number]   Nancy Contreras is a 38 y.o.  at 26w3d with a working estimated date of delivery of 2024, by Last Menstrual Period who presents for a routine prenatal visit. Good FM, no OB complaints. Had an isolated episode of urinary incontinence. Otherwise denies urinary symptoms.    Objective   Physical Exam  Vitals:    24 1624   BP: 108/68      Weight: 63.3 kg (139 lb 9.6 oz), Pregravid BMI: 20.38  Expected Total Weight Gain: 11.5 kg (25 lb)-16 kg (35 lb)   Fundal height and FHR per prenatal flowsheet    Assessment/Plan     Nancy Contreras is a 38 y.o.  at 26w3d with a working estimated date of delivery of 2024, by Last Menstrual Period who presents for a routine prenatal visit.    Reviewed second tri labs - repeat 3hr GTT with 1/4 values above range, no GDM. Discussed tdap next visit, amenable. Reviewed strong recommendation for flu and COVID vaccinations in pregnancy. Declines after counseling. Isolated episode of urinary incontinence. Otherwise asymptomatic. If persists, will send urine for urinalysis and consider PFPT. Remainder of plan per problem list as below.     Problem List Items Addressed This Visit       Elevated blood sugar - Primary    Overview     -Early A1c 5.7% -> early 3hr GTT with 1/4 values above range  -Repeat 3hr GTT at 24-28wga with 1/4 values above range, no GDM         26 weeks gestation of pregnancy ([facility])    Overview     Next Visit  [ ] Tdap  [ ] Postpartum planning    Desired provider in labor: [] CNM  [x] Physician  [x] Blood Products: [x] Yes, accepts [] No, needs counseling  [x] Initial BMI: 20.38   [x] Prenatal Labs: A1c 5.7%, early 3 hr GTT with 1/4 values above range  [x] Cervical Cancer Screening: pap NIL/HRHPV neg (2021)  [x] Rh status: positive  [x] Genetic Screening: risk-reducing cfDNA  [x] NT US (11-13 wks): WNL  [x] Baby ASA (if indicated): 162 mg (h/o gHTN)  [x] Pregnancy dated by: LMP c/w 13 week scan    [x] Anatomy  US (19-20 wks): WNL  [] Federal Sterilization consent signed (if indicated):  [x] 1hr GCT at 24-28wks: early 3 hour with 1/4 values above range, repeat 3 hour with 1/4 values above range -> no GDM  [] Fetal Surveillance (if indicated):  [] Tdap (27-32 wks, may be given up to 36 wks if initial window missed):   [] RSV (32-36 wks) (Sept. to end ):   [x] Flu Vaccine: declines  [x] COVID Vaccine: declines    [] Breastfeeding:  [] Postpartum Birth control method:   [] GBS at 36 - 37 wks:  [] 39 weeks discussion of IOL vs. Expectant management:  [x] Mode of delivery ( anticipated ):           Follow up in 2 weeks for a routine prenatal visit.    Aliza Franklin MD

## 2024-09-26 ENCOUNTER — APPOINTMENT (OUTPATIENT)
Dept: OBSTETRICS AND GYNECOLOGY | Facility: CLINIC | Age: 39
End: 2024-09-26
Payer: COMMERCIAL

## 2024-10-10 ENCOUNTER — APPOINTMENT (OUTPATIENT)
Dept: OBSTETRICS AND GYNECOLOGY | Facility: CLINIC | Age: 39
End: 2024-10-10
Payer: COMMERCIAL

## 2024-10-10 VITALS — SYSTOLIC BLOOD PRESSURE: 120 MMHG | DIASTOLIC BLOOD PRESSURE: 79 MMHG | BODY MASS INDEX: 24.55 KG/M2 | WEIGHT: 138.6 LBS

## 2024-10-10 DIAGNOSIS — Z86.711 HISTORY OF PULMONARY EMBOLISM: ICD-10-CM

## 2024-10-10 DIAGNOSIS — Z23 NEED FOR VACCINATION: ICD-10-CM

## 2024-10-10 DIAGNOSIS — Z3A.28 28 WEEKS GESTATION OF PREGNANCY (HHS-HCC): Primary | ICD-10-CM

## 2024-10-10 ASSESSMENT — EDINBURGH POSTNATAL DEPRESSION SCALE (EPDS)
I HAVE BEEN ANXIOUS OR WORRIED FOR NO GOOD REASON: NO, NOT AT ALL
I HAVE BEEN SO UNHAPPY THAT I HAVE HAD DIFFICULTY SLEEPING: NOT AT ALL
I HAVE LOOKED FORWARD WITH ENJOYMENT TO THINGS: AS MUCH AS I EVER DID
I HAVE BLAMED MYSELF UNNECESSARILY WHEN THINGS WENT WRONG: NO, NEVER
TOTAL SCORE: 0
THE THOUGHT OF HARMING MYSELF HAS OCCURRED TO ME: NEVER
I HAVE BEEN SO UNHAPPY THAT I HAVE BEEN CRYING: NO, NEVER
I HAVE BEEN ABLE TO LAUGH AND SEE THE FUNNY SIDE OF THINGS: AS MUCH AS I ALWAYS COULD
THINGS HAVE BEEN GETTING ON TOP OF ME: NO, I HAVE BEEN COPING AS WELL AS EVER
I HAVE FELT SCARED OR PANICKY FOR NO GOOD REASON: NO, NOT AT ALL
I HAVE FELT SAD OR MISERABLE: NO, NOT AT ALL

## 2024-10-10 NOTE — PROGRESS NOTES
Subjective   Patient ID [id number]   Nancy Contreras is a 38 y.o.  at 28w5d with a working estimated date of delivery of 2024, by Last Menstrual Period who presents for a routine prenatal visit. Good FM, no OB complaints.    Objective   Physical Exam  Vitals:    10/10/24 0950   BP: 120/79      Weight: 62.9 kg (138 lb 9.6 oz), Pregravid BMI: 20.38  Expected Total Weight Gain: 11.5 kg (25 lb)-16 kg (35 lb)   Fundal height and FHR per prenatal flowsheet    Assessment/Plan     Nancy Contreras is a 38 y.o.  at 28w5d with a working estimated date of delivery of 2024, by Last Menstrual Period who presents for a routine prenatal visit.    Tdap today. Planning to breastfeed, has pump. Has pediatrician. Discussed contraception - has completed childbearing. Not a candidate for estrogen given h/o VTE. Discussed BTL and LARCs. [email or web address] provided. To continue to address. Reviewed postpartum period as highest risk time for VTE. Discussed postpartum lovenox which patient will consider. Remainder of plan per problem list as below.     Problem List Items Addressed This Visit       History of pulmonary embolism    Overview     -In  while on OCPs  -No FHx of VTE  -Thrombophilia workup last pregnancy notable only for mildly low protein S, no follow-up completed  -s/p MFM consult last pregnancy, was recommended for at least PPx lovenox which she ultimately declined   -Re-counseled this pregnancy and again declines. Recommended repeat MFM consult for counseling but per patient would not change her decision so declines MFM consult  -Planning to breast feed, discussed postpartum lovenox, patient will consider  -Low threshold to evaluate for VTE         28 weeks gestation of pregnancy ([facility]) - Primary    Overview     Next Visit  [ ] follow-up contraception (completed childbearing, [email or web address] given last visit)  [ ] RSV info    Desired provider in labor: [] CNM  [x] Physician  [x] Blood Products: [x] Yes,  accepts [] No, needs counseling  [x] Initial BMI: 20.38   [x] Prenatal Labs: A1c 5.7%, early 3 hr GTT with 1/4 values above range  [x] Cervical Cancer Screening: pap NIL/HRHPV neg (2021)  [x] Rh status: positive  [x] Genetic Screening: risk-reducing cfDNA  [x] NT US (11-13 wks): WNL  [x] Baby ASA (if indicated): 162 mg (h/o gHTN)  [x] Pregnancy dated by: LMP c/w 13 week scan    [x] Anatomy US (19-20 wks): WNL  [] Federal Sterilization consent signed (if indicated):  [x] 1hr GCT at 24-28wks: early 3 hour with 1/4 values above range, repeat 3 hour with 1/4 values above range -> no GDM  [] Fetal Surveillance (if indicated):  [x] Tdap (27-32 wks, may be given up to 36 wks if initial window missed): given 10/10/24  [] RSV (32-36 wks) (Sept. to end of ):   [x] Flu Vaccine: declines  [x] COVID Vaccine: declines    [x] Breastfeeding: yes, has pump  [] Postpartum Birth control method:   [] GBS at 36 - 37 wks:  [] 39 weeks discussion of IOL vs. Expectant management:  [x] Mode of delivery ( anticipated ):           Other Visit Diagnoses       Need for vaccination        Relevant Orders    Tdap vaccine, age 7 years and older  (BOOSTRIX) (Completed)            Follow up in 2 weeks for a routine prenatal visit.    Aliza Franklin MD

## 2024-10-21 ENCOUNTER — HOSPITAL ENCOUNTER (OUTPATIENT)
Dept: RADIOLOGY | Facility: CLINIC | Age: 39
Discharge: HOME | End: 2024-10-21
Payer: COMMERCIAL

## 2024-10-21 DIAGNOSIS — O09.529 ANTEPARTUM MULTIGRAVIDA OF ADVANCED MATERNAL AGE (HHS-HCC): ICD-10-CM

## 2024-10-21 PROCEDURE — 76816 OB US FOLLOW-UP PER FETUS: CPT | Performed by: MEDICAL GENETICS

## 2024-10-21 PROCEDURE — 76816 OB US FOLLOW-UP PER FETUS: CPT

## 2024-10-21 PROCEDURE — 76819 FETAL BIOPHYS PROFIL W/O NST: CPT | Performed by: MEDICAL GENETICS

## 2024-10-21 PROCEDURE — 76819 FETAL BIOPHYS PROFIL W/O NST: CPT

## 2024-10-24 ENCOUNTER — APPOINTMENT (OUTPATIENT)
Dept: OBSTETRICS AND GYNECOLOGY | Facility: CLINIC | Age: 39
End: 2024-10-24
Payer: COMMERCIAL

## 2024-10-24 VITALS — WEIGHT: 141.4 LBS | SYSTOLIC BLOOD PRESSURE: 132 MMHG | BODY MASS INDEX: 25.05 KG/M2 | DIASTOLIC BLOOD PRESSURE: 82 MMHG

## 2024-10-24 DIAGNOSIS — Z86.711 HISTORY OF PULMONARY EMBOLISM: Primary | ICD-10-CM

## 2024-10-24 DIAGNOSIS — Z3A.30 30 WEEKS GESTATION OF PREGNANCY (HHS-HCC): ICD-10-CM

## 2024-10-24 PROCEDURE — 0501F PRENATAL FLOW SHEET: CPT | Performed by: STUDENT IN AN ORGANIZED HEALTH CARE EDUCATION/TRAINING PROGRAM

## 2024-10-24 NOTE — PROGRESS NOTES
Subjective   Patient ID [id number]   Nancy Contreras is a 38 y.o.  at 30w5d with a working estimated date of delivery of 2024, by Last Menstrual Period who presents for a routine prenatal visit. Good FM, no OB complaints.    Objective   Physical Exam  Vitals:    10/24/24 1137   BP: 132/82      Weight: 64.1 kg (141 lb 6.4 oz), Pregravid BMI: 20.38  Expected Total Weight Gain: 11.5 kg (25 lb)-16 kg (35 lb)   Fundal height and FHR per prenatal flowsheet    Assessment/Plan     Nancy Contreras is a 38 y.o.  at 30w5d with a working estimated date of delivery of 2024, by Last Menstrual Period who presents for a routine prenatal visit.    Reviewed 30 week growth - EFW 83%ile. No follow-up needed per MFM. Discussed lack of MFM consensus on growths for age 35-39. Okay for 36 week growth if patient desires. Otherwise will defer pending fundal heights. Discussed contraception again - reviewed progesterone-only options. Remains undecided. Has not thought any more about postpartum lovenox, to discuss with . Remainder of plan per problem list as below.     Problem List Items Addressed This Visit       History of pulmonary embolism - Primary    Overview     -In  while on OCPs  -No FHx of VTE  -Thrombophilia workup last pregnancy notable only for mildly low protein S, no follow-up completed  -s/p MFM consult last pregnancy, was recommended for at least PPx lovenox which she ultimately declined   -Re-counseled this pregnancy and again declines. Recommended repeat MFM consult for counseling but per patient would not change her decision so declines MFM consult  -Planning to breast feed, discussed postpartum lovenox, patient will consider  -Low threshold to evaluate for VTE         30 weeks gestation of pregnancy ([facility])    Overview     Last growth (10/21): EFW 83%, AC 87%, HC 31%    Next Visit  [ ] RSV   [ ] Birth preferences  [ ] follow-up postpartum lovenox (was going to discuss with  )    Desired provider in labor: [] CNM  [x] Physician  [x] Blood Products: [x] Yes, accepts [] No, needs counseling  [x] Initial BMI: 20.38   [x] Prenatal Labs: A1c 5.7%, early 3 hr GTT with 1/4 values above range  [x] Cervical Cancer Screening: pap NIL/HRHPV neg (2021)  [x] Rh status: positive  [x] Genetic Screening: risk-reducing cfDNA  [x] NT US (11-13 wks): WNL  [x] Baby ASA (if indicated): 162 mg (h/o gHTN)  [x] Pregnancy dated by: LMP c/w 13 week scan    [x] Anatomy US (19-20 wks): WNL  [x] Federal Sterilization consent signed (if indicated): N/A (private insurance)  [x] 1hr GCT at 24-28wks: early 3 hour with 1/4 values above range, repeat 3 hour with 1/4 values above range -> no GDM  [] Fetal Surveillance (if indicated):   [x] Tdap (27-32 wks, may be given up to 36 wks if initial window missed): given 10/10/24  [] RSV (32-36 wks) (Sept. to end of ):   [x] Flu Vaccine: declines  [x] COVID Vaccine: declines    [x] Breastfeeding: yes, has pump  [] Postpartum Birth control method: undecided, [email or web address] provided  [] GBS at 36 - 37 wks:  [] 39 weeks discussion of IOL vs. Expectant management:  [x] Mode of delivery ( anticipated ):           Follow up in 2 weeks for a routine prenatal visit.    Aliza Franklin MD

## 2024-11-07 PROBLEM — Z3A.32 32 WEEKS GESTATION OF PREGNANCY (HHS-HCC): Status: ACTIVE | Noted: 2024-05-06

## 2024-11-08 ENCOUNTER — APPOINTMENT (OUTPATIENT)
Dept: OBSTETRICS AND GYNECOLOGY | Facility: CLINIC | Age: 39
End: 2024-11-08
Payer: COMMERCIAL

## 2024-11-08 VITALS — WEIGHT: 143 LBS | DIASTOLIC BLOOD PRESSURE: 54 MMHG | SYSTOLIC BLOOD PRESSURE: 125 MMHG | BODY MASS INDEX: 25.33 KG/M2

## 2024-11-08 DIAGNOSIS — Z86.711 HISTORY OF PULMONARY EMBOLISM: Primary | ICD-10-CM

## 2024-11-08 DIAGNOSIS — Z23 NEED FOR VACCINATION: ICD-10-CM

## 2024-11-08 DIAGNOSIS — Z3A.32 32 WEEKS GESTATION OF PREGNANCY (HHS-HCC): ICD-10-CM

## 2024-11-08 PROCEDURE — 0501F PRENATAL FLOW SHEET: CPT | Performed by: STUDENT IN AN ORGANIZED HEALTH CARE EDUCATION/TRAINING PROGRAM

## 2024-11-08 NOTE — PROGRESS NOTES
Subjective   Patient ID [id number]   Nancy Contreras is a 38 y.o.  at 32w6d with a working estimated date of delivery of 2024, by Last Menstrual Period who presents for a routine prenatal visit. Good FM, no OB complaints.    Objective   Physical Exam  Vitals:    24 0917   BP: 125/54      Weight: 64.9 kg (143 lb), Pregravid BMI: 20.38  Expected Total Weight Gain: 11.5 kg (25 lb)-16 kg (35 lb)   Fundal height and FHR per prenatal flowsheet    Assessment/Plan     Nancy Contreras is a 38 y.o.  at 32w6d with a working estimated date of delivery of 2024, by Last Menstrual Period who presents for a routine prenatal visit.    Declines RSV after counseling. Birth preferences provided. Undecided on postpartum lovenox but is aware that it is recommended. Desires expectant management but open to IOL by 41wga if needed. PTL precautions provided. Remainder of plan per problem list as below.     Problem List Items Addressed This Visit       32 weeks gestation of pregnancy ([facility])    Overview     Last growth (10/21): EFW 83%, AC 87%, HC 31%    Next Visit  [ ] RSV   [ ] Birth preferences  [ ] follow-up postpartum lovenox (was going to discuss with )    Desired provider in labor: [] CNM  [x] Physician  [x] Blood Products: [x] Yes, accepts [] No, needs counseling  [x] Initial BMI: 20.38   [x] Prenatal Labs: A1c 5.7%, early 3 hr GTT with 1/4 values above range  [x] Cervical Cancer Screening: pap NIL/HRHPV neg (2021)  [x] Rh status: positive  [x] Genetic Screening: risk-reducing cfDNA  [x] NT US (11-13 wks): WNL  [x] Baby ASA (if indicated): 162 mg (h/o gHTN)  [x] Pregnancy dated by: LMP c/w 13 week scan    [x] Anatomy US (19-20 wks): WNL  [x] Federal Sterilization consent signed (if indicated): N/A (private insurance)  [x] 1hr GCT at 24-28wks: early 3 hour with 1/4 values above range, repeat 3 hour with 1/4 values above range -> no GDM  [] Fetal Surveillance (if indicated):   [x] Tdap (27-32 wks,  may be given up to 36 wks if initial window missed): given 10/10/24  [] RSV (32-36 wks) (Sept. to end ):   [x] Flu Vaccine: declines  [x] COVID Vaccine: declines    [x] Breastfeeding: yes, has pump  [] Postpartum Birth control method: undecided, [email or web address] provided  [] GBS at 36 - 37 wks:  [] 39 weeks discussion of IOL vs. Expectant management:  [x] Mode of delivery ( anticipated ):          History of pulmonary embolism - Primary    Overview     -In  while on OCPs  -No FHx of VTE  -Thrombophilia workup last pregnancy notable only for mildly low protein S, no follow-up completed  -s/p MFM consult last pregnancy, was recommended for at least PPx lovenox which she ultimately declined   -Re-counseled this pregnancy and again declines. Recommended repeat MFM consult for counseling but per patient would not change her decision so declines MFM consult  -Planning to breast feed, discussed postpartum lovenox, patient will consider  -Low threshold to evaluate for VTE          Other Visit Diagnoses       Need for vaccination              Follow up in 2 weeks for a routine prenatal visit.    Aliza Franklin MD

## 2024-11-18 ENCOUNTER — APPOINTMENT (OUTPATIENT)
Dept: OBSTETRICS AND GYNECOLOGY | Facility: CLINIC | Age: 39
End: 2024-11-18
Payer: COMMERCIAL

## 2024-11-18 VITALS — WEIGHT: 144.4 LBS | DIASTOLIC BLOOD PRESSURE: 72 MMHG | SYSTOLIC BLOOD PRESSURE: 125 MMHG | BODY MASS INDEX: 25.58 KG/M2

## 2024-11-18 DIAGNOSIS — Z23 NEED FOR VACCINATION: ICD-10-CM

## 2024-11-18 DIAGNOSIS — Z3A.34 34 WEEKS GESTATION OF PREGNANCY (HHS-HCC): Primary | ICD-10-CM

## 2024-11-18 PROCEDURE — 0501F PRENATAL FLOW SHEET: CPT | Performed by: STUDENT IN AN ORGANIZED HEALTH CARE EDUCATION/TRAINING PROGRAM

## 2024-11-18 NOTE — PROGRESS NOTES
Subjective   Patient ID [id number]   Nancy Contreras is a 38 y.o.  at 34w2d with a working estimated date of delivery of 2024, by Last Menstrual Period who presents for a routine prenatal visit. Good FM, no OB complaints.    Objective   Physical Exam  Vitals:    24 1246   BP: 125/72      Weight: 65.5 kg (144 lb 6.4 oz), Pregravid BMI: 20.38  Expected Total Weight Gain: 11.5 kg (25 lb)-16 kg (35 lb)   Fundal height and FHR per prenatal flowsheet    Assessment/Plan     Nancy Contreras is a 38 y.o.  at 34w2d with a working estimated date of delivery of 2024, by Last Menstrual Period who presents for a routine prenatal visit.    Reviewed birth preferences - amenable to IOL by 41 weeks. Desires epidural. Discussed GBS next visit. Remainder of plan per problem list as below.     Problem List Items Addressed This Visit       34 weeks gestation of pregnancy ([facility]) - Primary    Overview     Last growth (10/21): EFW 83%, AC 87%, HC 31%    Next Visit  [ ] GBS  [ ] Scan    Desired provider in labor: [] CNM  [x] Physician  [x] Blood Products: [x] Yes, accepts [] No, needs counseling  [x] Initial BMI: 20.38   [x] Prenatal Labs: A1c 5.7%, early 3 hr GTT with 1/4 values above range  [x] Cervical Cancer Screening: pap NIL/HRHPV neg (2021)  [x] Rh status: positive  [x] Genetic Screening: risk-reducing cfDNA  [x] NT US (11-13 wks): WNL  [x] Baby ASA (if indicated): 162 mg (h/o gHTN)  [x] Pregnancy dated by: LMP c/w 13 week scan    [x] Anatomy US (19-20 wks): WNL  [x] Federal Sterilization consent signed (if indicated): N/A (private insurance)  [x] 1hr GCT at 24-28wks: early 3 hour with 1/4 values above range, repeat 3 hour with 1/4 values above range -> no GDM  [] Fetal Surveillance (if indicated):   [x] Tdap (27-32 wks, may be given up to 36 wks if initial window missed): given 10/10/24  [x] RSV (32-36 wks) (Sept. to end ): declines  [x] Flu Vaccine: declines  [x] COVID Vaccine: declines    [x]  Breastfeeding: yes, has pump  [x] Postpartum Birth control method: interval Mirena  [] GBS at 36 - 37 wks:  [x] 39 weeks discussion of IOL vs. Expectant management: expectant but open to IOL by 41wga  [x] Mode of delivery ( anticipated ):           Other Visit Diagnoses       Need for vaccination              Follow up in 2 weeks for a routine prenatal visit.    Aliza Franklin MD

## 2024-11-19 ENCOUNTER — APPOINTMENT (OUTPATIENT)
Dept: OBSTETRICS AND GYNECOLOGY | Facility: CLINIC | Age: 39
End: 2024-11-19
Payer: COMMERCIAL

## 2024-12-02 PROBLEM — Z3A.36 36 WEEKS GESTATION OF PREGNANCY (HHS-HCC): Status: ACTIVE | Noted: 2024-05-06

## 2024-12-03 ENCOUNTER — HOSPITAL ENCOUNTER (OUTPATIENT)
Facility: HOSPITAL | Age: 39
Discharge: HOME | End: 2024-12-03
Attending: OBSTETRICS & GYNECOLOGY | Admitting: OBSTETRICS & GYNECOLOGY
Payer: COMMERCIAL

## 2024-12-03 ENCOUNTER — APPOINTMENT (OUTPATIENT)
Dept: OBSTETRICS AND GYNECOLOGY | Facility: CLINIC | Age: 39
End: 2024-12-03
Payer: COMMERCIAL

## 2024-12-03 VITALS — SYSTOLIC BLOOD PRESSURE: 126 MMHG | HEART RATE: 85 BPM | OXYGEN SATURATION: 98 % | DIASTOLIC BLOOD PRESSURE: 69 MMHG

## 2024-12-03 VITALS
DIASTOLIC BLOOD PRESSURE: 86 MMHG | BODY MASS INDEX: 26.59 KG/M2 | SYSTOLIC BLOOD PRESSURE: 146 MMHG | WEIGHT: 150.13 LBS

## 2024-12-03 DIAGNOSIS — O16.3 ELEVATED BLOOD PRESSURE AFFECTING PREGNANCY IN THIRD TRIMESTER, ANTEPARTUM (HHS-HCC): ICD-10-CM

## 2024-12-03 DIAGNOSIS — O09.529 ANTEPARTUM MULTIGRAVIDA OF ADVANCED MATERNAL AGE (HHS-HCC): ICD-10-CM

## 2024-12-03 DIAGNOSIS — Z87.59 HISTORY OF GESTATIONAL HYPERTENSION: ICD-10-CM

## 2024-12-03 DIAGNOSIS — Z88.0 PENICILLIN ALLERGY: ICD-10-CM

## 2024-12-03 DIAGNOSIS — Z3A.36 36 WEEKS GESTATION OF PREGNANCY (HHS-HCC): Primary | ICD-10-CM

## 2024-12-03 LAB
ALBUMIN SERPL BCP-MCNC: 3.1 G/DL (ref 3.4–5)
ALP SERPL-CCNC: 117 U/L (ref 33–110)
ALT SERPL W P-5'-P-CCNC: 9 U/L (ref 7–45)
ANION GAP SERPL CALC-SCNC: 9 MMOL/L (ref 10–20)
AST SERPL W P-5'-P-CCNC: 14 U/L (ref 9–39)
BILIRUB SERPL-MCNC: 0.8 MG/DL (ref 0–1.2)
BUN SERPL-MCNC: 8 MG/DL (ref 6–23)
CALCIUM SERPL-MCNC: 8.6 MG/DL (ref 8.6–10.3)
CHLORIDE SERPL-SCNC: 105 MMOL/L (ref 98–107)
CO2 SERPL-SCNC: 23 MMOL/L (ref 21–32)
CREAT SERPL-MCNC: 0.57 MG/DL (ref 0.5–1.05)
CREAT UR-MCNC: 105.9 MG/DL (ref 20–320)
EGFRCR SERPLBLD CKD-EPI 2021: >90 ML/MIN/1.73M*2
ERYTHROCYTE [DISTWIDTH] IN BLOOD BY AUTOMATED COUNT: 13.2 % (ref 11.5–14.5)
GLUCOSE SERPL-MCNC: 98 MG/DL (ref 74–99)
HCT VFR BLD AUTO: 37.6 % (ref 36–46)
HGB BLD-MCNC: 12.1 G/DL (ref 12–16)
LDH SERPL L TO P-CCNC: 113 U/L (ref 84–246)
MCH RBC QN AUTO: 30.6 PG (ref 26–34)
MCHC RBC AUTO-ENTMCNC: 32.2 G/DL (ref 32–36)
MCV RBC AUTO: 95 FL (ref 80–100)
NRBC BLD-RTO: 0 /100 WBCS (ref 0–0)
PLATELET # BLD AUTO: 145 X10*3/UL (ref 150–450)
POTASSIUM SERPL-SCNC: 4.2 MMOL/L (ref 3.5–5.3)
PROT SERPL-MCNC: 6 G/DL (ref 6.4–8.2)
PROT UR-ACNC: 14 MG/DL (ref 5–24)
PROT/CREAT UR: 0.13 MG/MG CREAT (ref 0–0.17)
RBC # BLD AUTO: 3.96 X10*6/UL (ref 4–5.2)
SODIUM SERPL-SCNC: 133 MMOL/L (ref 136–145)
URATE SERPL-MCNC: 3.6 MG/DL (ref 2.3–6.7)
WBC # BLD AUTO: 9.5 X10*3/UL (ref 4.4–11.3)

## 2024-12-03 PROCEDURE — 99214 OFFICE O/P EST MOD 30 MIN: CPT | Performed by: OBSTETRICS & GYNECOLOGY

## 2024-12-03 PROCEDURE — 59025 FETAL NON-STRESS TEST: CPT | Performed by: OBSTETRICS & GYNECOLOGY

## 2024-12-03 PROCEDURE — 85027 COMPLETE CBC AUTOMATED: CPT | Performed by: OBSTETRICS & GYNECOLOGY

## 2024-12-03 PROCEDURE — 82570 ASSAY OF URINE CREATININE: CPT | Performed by: OBSTETRICS & GYNECOLOGY

## 2024-12-03 PROCEDURE — 36415 COLL VENOUS BLD VENIPUNCTURE: CPT | Performed by: OBSTETRICS & GYNECOLOGY

## 2024-12-03 PROCEDURE — 84156 ASSAY OF PROTEIN URINE: CPT | Performed by: OBSTETRICS & GYNECOLOGY

## 2024-12-03 PROCEDURE — 83615 LACTATE (LD) (LDH) ENZYME: CPT | Performed by: OBSTETRICS & GYNECOLOGY

## 2024-12-03 PROCEDURE — 87081 CULTURE SCREEN ONLY: CPT

## 2024-12-03 PROCEDURE — 84550 ASSAY OF BLOOD/URIC ACID: CPT | Performed by: OBSTETRICS & GYNECOLOGY

## 2024-12-03 PROCEDURE — 84075 ASSAY ALKALINE PHOSPHATASE: CPT | Performed by: OBSTETRICS & GYNECOLOGY

## 2024-12-03 PROCEDURE — 99213 OFFICE O/P EST LOW 20 MIN: CPT | Mod: 25

## 2024-12-03 PROCEDURE — 0501F PRENATAL FLOW SHEET: CPT | Performed by: STUDENT IN AN ORGANIZED HEALTH CARE EDUCATION/TRAINING PROGRAM

## 2024-12-03 RX ORDER — ONDANSETRON HYDROCHLORIDE 2 MG/ML
4 INJECTION, SOLUTION INTRAVENOUS EVERY 6 HOURS PRN
Status: DISCONTINUED | OUTPATIENT
Start: 2024-12-03 | End: 2024-12-03 | Stop reason: HOSPADM

## 2024-12-03 RX ORDER — LABETALOL HYDROCHLORIDE 5 MG/ML
20 INJECTION, SOLUTION INTRAVENOUS ONCE AS NEEDED
Status: DISCONTINUED | OUTPATIENT
Start: 2024-12-03 | End: 2024-12-03 | Stop reason: HOSPADM

## 2024-12-03 RX ORDER — HYDRALAZINE HYDROCHLORIDE 20 MG/ML
5 INJECTION INTRAMUSCULAR; INTRAVENOUS ONCE AS NEEDED
Status: DISCONTINUED | OUTPATIENT
Start: 2024-12-03 | End: 2024-12-03 | Stop reason: HOSPADM

## 2024-12-03 RX ORDER — ONDANSETRON 4 MG/1
4 TABLET, FILM COATED ORAL EVERY 6 HOURS PRN
Status: DISCONTINUED | OUTPATIENT
Start: 2024-12-03 | End: 2024-12-03 | Stop reason: HOSPADM

## 2024-12-03 RX ORDER — LIDOCAINE HYDROCHLORIDE 10 MG/ML
0.5 INJECTION, SOLUTION EPIDURAL; INFILTRATION; INTRACAUDAL; PERINEURAL ONCE AS NEEDED
Status: DISCONTINUED | OUTPATIENT
Start: 2024-12-03 | End: 2024-12-03 | Stop reason: HOSPADM

## 2024-12-03 RX ORDER — NIFEDIPINE 10 MG/1
10 CAPSULE ORAL ONCE AS NEEDED
Status: DISCONTINUED | OUTPATIENT
Start: 2024-12-03 | End: 2024-12-03 | Stop reason: HOSPADM

## 2024-12-03 SDOH — SOCIAL STABILITY: SOCIAL INSECURITY: ABUSE SCREEN: ADULT

## 2024-12-03 SDOH — ECONOMIC STABILITY: HOUSING INSECURITY: DO YOU FEEL UNSAFE GOING BACK TO THE PLACE WHERE YOU ARE LIVING?: NO

## 2024-12-03 SDOH — SOCIAL STABILITY: SOCIAL INSECURITY: HAVE YOU HAD ANY THOUGHTS OF HARMING ANYONE ELSE?: NO

## 2024-12-03 SDOH — SOCIAL STABILITY: SOCIAL INSECURITY: PHYSICAL ABUSE: DENIES

## 2024-12-03 SDOH — SOCIAL STABILITY: SOCIAL INSECURITY: DO YOU FEEL ANYONE HAS EXPLOITED OR TAKEN ADVANTAGE OF YOU FINANCIALLY OR OF YOUR PERSONAL PROPERTY?: NO

## 2024-12-03 SDOH — SOCIAL STABILITY: SOCIAL INSECURITY: DOES ANYONE TRY TO KEEP YOU FROM HAVING/CONTACTING OTHER FRIENDS OR DOING THINGS OUTSIDE YOUR HOME?: NO

## 2024-12-03 SDOH — SOCIAL STABILITY: SOCIAL INSECURITY: HAS ANYONE EVER THREATENED TO HURT YOUR FAMILY OR YOUR PETS?: NO

## 2024-12-03 SDOH — SOCIAL STABILITY: SOCIAL INSECURITY: ARE YOU OR HAVE YOU BEEN THREATENED OR ABUSED PHYSICALLY, EMOTIONALLY, OR SEXUALLY BY ANYONE?: NO

## 2024-12-03 SDOH — SOCIAL STABILITY: SOCIAL INSECURITY: HAVE YOU HAD THOUGHTS OF HARMING ANYONE ELSE?: NO

## 2024-12-03 SDOH — SOCIAL STABILITY: SOCIAL INSECURITY: VERBAL ABUSE: DENIES

## 2024-12-03 SDOH — HEALTH STABILITY: MENTAL HEALTH: SUICIDAL BEHAVIOR (LIFETIME): NO

## 2024-12-03 SDOH — HEALTH STABILITY: MENTAL HEALTH: WISH TO BE DEAD (PAST 1 MONTH): NO

## 2024-12-03 SDOH — HEALTH STABILITY: MENTAL HEALTH: WERE YOU ABLE TO COMPLETE ALL THE BEHAVIORAL HEALTH SCREENINGS?: YES

## 2024-12-03 SDOH — HEALTH STABILITY: MENTAL HEALTH: NON-SPECIFIC ACTIVE SUICIDAL THOUGHTS (PAST 1 MONTH): NO

## 2024-12-03 SDOH — SOCIAL STABILITY: SOCIAL INSECURITY: ARE THERE ANY APPARENT SIGNS OF INJURIES/BEHAVIORS THAT COULD BE RELATED TO ABUSE/NEGLECT?: NO

## 2024-12-03 SDOH — HEALTH STABILITY: MENTAL HEALTH: HAVE YOU USED ANY SUBSTANCES (CANABIS, COCAINE, HEROIN, HALLUCINOGENS, INHALANTS, ETC.) IN THE PAST 12 MONTHS?: NO

## 2024-12-03 SDOH — HEALTH STABILITY: MENTAL HEALTH: HAVE YOU USED ANY PRESCRIPTION DRUGS OTHER THAN PRESCRIBED IN THE PAST 12 MONTHS?: NO

## 2024-12-03 ASSESSMENT — PATIENT HEALTH QUESTIONNAIRE - PHQ9
SUM OF ALL RESPONSES TO PHQ9 QUESTIONS 1 & 2: 0
1. LITTLE INTEREST OR PLEASURE IN DOING THINGS: NOT AT ALL
2. FEELING DOWN, DEPRESSED OR HOPELESS: NOT AT ALL

## 2024-12-03 ASSESSMENT — LIFESTYLE VARIABLES
AUDIT-C TOTAL SCORE: 0
AUDIT-C TOTAL SCORE: 0
HOW OFTEN DO YOU HAVE A DRINK CONTAINING ALCOHOL: NEVER
HOW OFTEN DO YOU HAVE 6 OR MORE DRINKS ON ONE OCCASION: NEVER
SKIP TO QUESTIONS 9-10: 1
HOW MANY STANDARD DRINKS CONTAINING ALCOHOL DO YOU HAVE ON A TYPICAL DAY: PATIENT DOES NOT DRINK

## 2024-12-03 ASSESSMENT — ACTIVITIES OF DAILY LIVING (ADL)
JUDGMENT_ADEQUATE_SAFELY_COMPLETE_DAILY_ACTIVITIES: YES
ADEQUATE_TO_COMPLETE_ADL: YES
PATIENT'S MEMORY ADEQUATE TO SAFELY COMPLETE DAILY ACTIVITIES?: YES

## 2024-12-03 NOTE — PROGRESS NOTES
"Subjective   Patient ID [id number]   Nancy Contreras is a 39 y.o.  at 36w3d with a working estimated date of delivery of 2024, by Last Menstrual Period who presents for a routine prenatal visit. Good FM, no OB complaints. Feeling a little \"off\" today but denies HA, scotoma, SOB, or RUQ pain.     Objective   Physical Exam  Vitals:    24 1200   BP: 146/86      Weight: 68.1 kg (150 lb 2 oz), Pregravid BMI: 20.38  Expected Total Weight Gain: 11.5 kg (25 lb)-16 kg (35 lb)   Fundal height and FHR per prenatal flowsheet    Assessment/Plan     Nancy Contreras is a 39 y.o.  at 36w3d with a working estimated date of delivery of 2024, by Last Menstrual Period who presents for a routine prenatal visit.    GBS collected with sensitivities given PCN allergy. SVE 0/50/-3. Vertex confirmed by BSUS. Mild range BP with no prior diagnosis of HTN. Asymptomatic. Recommend presentation to L&D for PEC rule out to which patient is amenable. Report called to Dr. Philippe. Remainder of plan per problem list as below.     Problem List Items Addressed This Visit       36 weeks gestation of pregnancy ([facility]) - Primary    Overview     Last growth (10/21): EFW 83%, AC 87%, HC 31%    Next Visit  [ ] GBS  [ ] Scan    Desired provider in labor: [] CNM  [x] Physician  [x] Blood Products: [x] Yes, accepts [] No, needs counseling  [x] Initial BMI: 20.38   [x] Prenatal Labs: A1c 5.7%, early 3 hr GTT with 1/4 values above range  [x] Cervical Cancer Screening: pap NIL/HRHPV neg (2021)  [x] Rh status: positive  [x] Genetic Screening: risk-reducing cfDNA  [x] NT US (11-13 wks): WNL  [x] Baby ASA (if indicated): 162 mg (h/o gHTN)  [x] Pregnancy dated by: LMP c/w 13 week scan    [x] Anatomy US (19-20 wks): WNL  [x] Federal Sterilization consent signed (if indicated): N/A (private insurance)  [x] 1hr GCT at 24-28wks: early 3 hour with 1/4 values above range, repeat 3 hour with 1/4 values above range -> no GDM  [] Fetal " Surveillance (if indicated):   [x] Tdap (27-32 wks, may be given up to 36 wks if initial window missed): given 10/10/24  [x] RSV (32-36 wks) (Sept. to end ): declines  [x] Flu Vaccine: declines  [x] COVID Vaccine: declines    [x] Breastfeeding: yes, has pump  [x] Postpartum Birth control method: interval Mirena  [] GBS at 36 - 37 wks:  [x] 39 weeks discussion of IOL vs. Expectant management: expectant but open to IOL by 41wga  [x] Mode of delivery ( anticipated ):          Relevant Orders    Group B Streptococcus (GBS) Prenatal Screen, Culture    Antepartum multigravida of advanced maternal age ([facility])    Overview     -Risk reducing cfDNA and normal NT         Elevated blood pressure affecting pregnancy in third trimester, antepartum ([facility])    Overview     -/86 at 36 weeks -> to L&D         History of gestational hypertension    Overview     -Baseline HELLP labs neg, P:C too low to calculate  -For ASA PPx         Penicillin allergy    Overview     -Hives at age 13  -Allergy referral placed          To L&D for PEC r/o    Aliza Franklin MD

## 2024-12-03 NOTE — PROCEDURES
Nancy Contreras, a  at 36w3d with an BRANDT of 2024, by Last Menstrual Period, was seen at [facility] for a nonstress test.    Non-Stress Test   Baseline Fetal Heart Rate for Non-Stress Test: 120 BPM  Variability in Waveform for Non-Stress Test: Moderate  Accelerations in Non-Stress Test: Yes  Decelerations in Non-Stress Test: None  Contractions in Non-Stress Test: Not present  Acoustic Stimulator for Non-Stress Test: No  Interpretation of Non-Stress Test   Interpretation of Non-Stress Test: Reactive

## 2024-12-03 NOTE — H&P
OB Triage H&P    Assessment/Plan    Nancy Contreras is a 39 y.o.  at 36w3d, BRANDT: 2024, by Last Menstrual Period, who presents to triage for PEC r/o after one mild range BP in office.    Plan    -Fetal monitoring reassuring  -Good fetal movement  -Up to date on prenatal care  -Continue routine prenatal care  -PEC labs pending  -Urine protein pending  -GBS pending  -Continue to monitor BP's    Dispo  -Discharge home pending PEC labs and urine    Zoraida Alex, MS III    Attending addendum:   Pt seen and examined.  First BP here mildly elevated but all Bps WNL since then.  No symptoms of preeclampsia. Labs significant for platelets 145 - likely c/w gestational thrombocytopenia- history reviewed and she had similar platelet value at term in her prior pregnancy.  All other HELLP labs WNL.  Reviewed return precautions.  F/u with Dr. Franklin on Friday in 3 days.     Sonja Philippe MD     Pregnancy Problems (from 24 to present)       Problem Noted Diagnosed Resolved    Elevated blood pressure affecting pregnancy in third trimester, antepartum ([facility]) 12/3/2024 by Aliza Franklin MD  No    Priority:  Medium       Overview Signed 12/3/2024 12:59 PM by Aliza Franklin MD     -/86 at 36 weeks -> to L&D         36 weeks gestation of pregnancy ([facility]) 2024 by Aliza Franklin MD  No    Priority:  Medium       Overview Addendum 2024 12:58 PM by Aliza Franklin MD     Last growth (10/21): EFW 83%, AC 87%, HC 31%    Next Visit  [ ] GBS  [ ] Scan    Desired provider in labor: [] CNM  [x] Physician  [x] Blood Products: [x] Yes, accepts [] No, needs counseling  [x] Initial BMI: 20.38   [x] Prenatal Labs: A1c 5.7%, early 3 hr GTT with 1/4 values above range  [x] Cervical Cancer Screening: pap NIL/HRHPV neg (2021)  [x] Rh status: positive  [x] Genetic Screening: risk-reducing cfDNA  [x] NT US (11-13 wks): WNL  [x] Baby ASA (if indicated): 162 mg (h/o gHTN)  [x] Pregnancy dated by:  "LMP c/w 13 week scan    [x] Anatomy US (19-20 wks): WNL  [x] Federal Sterilization consent signed (if indicated): N/A (private insurance)  [x] 1hr GCT at 24-28wks: early 3 hour with 1/4 values above range, repeat 3 hour with 1/4 values above range -> no GDM  [] Fetal Surveillance (if indicated):   [x] Tdap (27-32 wks, may be given up to 36 wks if initial window missed): given 10/10/24  [x] RSV (32-36 wks) (Sept. to end of ): declines  [x] Flu Vaccine: declines  [x] COVID Vaccine: declines    [x] Breastfeeding: yes, has pump  [x] Postpartum Birth control method: interval Mirena  [] GBS at 36 - 37 wks:  [x] 39 weeks discussion of IOL vs. Expectant management: expectant but open to IOL by 41wga  [x] Mode of delivery ( anticipated ):                Subjective   Good fetal movement. Denies vaginal bleeding. Denies leaking of fluid. Denies contractions. Was feeling \"off\" earlier but is feeling normal again. Denies any headaches, scotoma, RUQ pain, or SOB.    Prenatal Provider: Dr. Franklin    OB History    Para Term  AB Living   4 1 1 0 2 1   SAB IAB Ectopic Multiple Live Births   2 0 0 0 1      # Outcome Date GA Lbr David/2nd Weight Sex Type Anes PTL Lv   4 Current            3 Term 22 41w2d  3.005 kg F Vag-Spont EPI N MARILE      Name: [place]   2 SAB 2020     Complete  N    1 SAB      Complete  N      Past Surgical History:   Procedure Laterality Date    OTHER SURGICAL HISTORY  2022    [place] tooth extraction     Social History     Tobacco Use    Smoking status: Not on file    Smokeless tobacco: Not on file   Substance Use Topics    Alcohol use: Not on file     Allergies   Allergen Reactions    Penicillins Hives and Unknown     Hives, throat swelling    Possible hives/rash on her chest.     Medications Prior to Admission   Medication Sig Dispense Refill Last Dose/Taking    aspirin 81 mg EC tablet Take 2 tablets (162 mg) by mouth once daily. Start taking at 12 weeks and continue until you have " your baby 180 tablet 2 12/2/2024 Bedtime    prenatal vit 93/iron fum/folic (PRENATAL FORMULA ORAL) Take 1 tablet by mouth once daily.   12/2/2024 Bedtime    doxylamine (Unisom, doxylamine,) 25 mg tablet Take 1 tablet (25 mg) by mouth as needed at bedtime for sleep or nausea. 30 tablet 1      Objective     Last Vitals  Temp Pulse Resp BP MAP O2 Sat     86   115/83 93 98 %     Blood Pressures         12/3/2024  1355 12/3/2024  1410          BP: 145/76 115/83             Physical Exam  General: NAD, mood appropriate  Cardiopulmonary: warm and well perfused, breathing comfortably on room air  Abdomen: Gravid, non-tender  Extremities: Symmetric     Fetal Monitoring  Baseline: 130 bpm, Variability: moderate,  Accelerations: present and Decelerations: none  Uterine Activity: Irregular contractions  Interpretation: Category 1    Labs in chart were reviewed.        Prenatal labs reviewed, not remarkable.

## 2024-12-06 ENCOUNTER — HOSPITAL ENCOUNTER (OUTPATIENT)
Facility: HOSPITAL | Age: 39
Discharge: HOME | End: 2024-12-06
Attending: OBSTETRICS & GYNECOLOGY | Admitting: OBSTETRICS & GYNECOLOGY
Payer: COMMERCIAL

## 2024-12-06 ENCOUNTER — ROUTINE PRENATAL (OUTPATIENT)
Dept: OBSTETRICS AND GYNECOLOGY | Facility: CLINIC | Age: 39
End: 2024-12-06
Payer: COMMERCIAL

## 2024-12-06 VITALS
SYSTOLIC BLOOD PRESSURE: 140 MMHG | TEMPERATURE: 98.3 F | HEART RATE: 75 BPM | RESPIRATION RATE: 16 BRPM | OXYGEN SATURATION: 95 % | DIASTOLIC BLOOD PRESSURE: 65 MMHG

## 2024-12-06 VITALS — BODY MASS INDEX: 26.54 KG/M2 | WEIGHT: 149.8 LBS | SYSTOLIC BLOOD PRESSURE: 134 MMHG | DIASTOLIC BLOOD PRESSURE: 80 MMHG

## 2024-12-06 DIAGNOSIS — O13.3 GESTATIONAL HYPERTENSION, THIRD TRIMESTER (HHS-HCC): Primary | ICD-10-CM

## 2024-12-06 DIAGNOSIS — Z3A.36 36 WEEKS GESTATION OF PREGNANCY (HHS-HCC): ICD-10-CM

## 2024-12-06 LAB
ALBUMIN SERPL BCP-MCNC: 3.3 G/DL (ref 3.4–5)
ALP SERPL-CCNC: 112 U/L (ref 33–110)
ALT SERPL W P-5'-P-CCNC: 9 U/L (ref 7–45)
ANION GAP SERPL CALC-SCNC: 10 MMOL/L (ref 10–20)
AST SERPL W P-5'-P-CCNC: 14 U/L (ref 9–39)
BILIRUB SERPL-MCNC: 0.8 MG/DL (ref 0–1.2)
BUN SERPL-MCNC: 9 MG/DL (ref 6–23)
CALCIUM SERPL-MCNC: 8.8 MG/DL (ref 8.6–10.3)
CHLORIDE SERPL-SCNC: 104 MMOL/L (ref 98–107)
CO2 SERPL-SCNC: 23 MMOL/L (ref 21–32)
CREAT SERPL-MCNC: 0.57 MG/DL (ref 0.5–1.05)
CREAT UR-MCNC: 104.7 MG/DL (ref 20–320)
EGFRCR SERPLBLD CKD-EPI 2021: >90 ML/MIN/1.73M*2
ERYTHROCYTE [DISTWIDTH] IN BLOOD BY AUTOMATED COUNT: 13 % (ref 11.5–14.5)
GLUCOSE SERPL-MCNC: 107 MG/DL (ref 74–99)
HCT VFR BLD AUTO: 37.4 % (ref 36–46)
HGB BLD-MCNC: 12.2 G/DL (ref 12–16)
MCH RBC QN AUTO: 30.5 PG (ref 26–34)
MCHC RBC AUTO-ENTMCNC: 32.6 G/DL (ref 32–36)
MCV RBC AUTO: 94 FL (ref 80–100)
NRBC BLD-RTO: 0 /100 WBCS (ref 0–0)
PLATELET # BLD AUTO: 149 X10*3/UL (ref 150–450)
POTASSIUM SERPL-SCNC: 3.8 MMOL/L (ref 3.5–5.3)
PROT SERPL-MCNC: 6.1 G/DL (ref 6.4–8.2)
PROT UR-ACNC: 17 MG/DL (ref 5–24)
PROT/CREAT UR: 0.16 MG/MG CREAT (ref 0–0.17)
RBC # BLD AUTO: 4 X10*6/UL (ref 4–5.2)
SODIUM SERPL-SCNC: 133 MMOL/L (ref 136–145)
WBC # BLD AUTO: 10.9 X10*3/UL (ref 4.4–11.3)

## 2024-12-06 PROCEDURE — 36415 COLL VENOUS BLD VENIPUNCTURE: CPT

## 2024-12-06 PROCEDURE — 99215 OFFICE O/P EST HI 40 MIN: CPT | Performed by: OBSTETRICS & GYNECOLOGY

## 2024-12-06 PROCEDURE — 84075 ASSAY ALKALINE PHOSPHATASE: CPT | Performed by: OBSTETRICS & GYNECOLOGY

## 2024-12-06 PROCEDURE — 59025 FETAL NON-STRESS TEST: CPT | Performed by: OBSTETRICS & GYNECOLOGY

## 2024-12-06 PROCEDURE — 84156 ASSAY OF PROTEIN URINE: CPT | Performed by: OBSTETRICS & GYNECOLOGY

## 2024-12-06 PROCEDURE — 99212 OFFICE O/P EST SF 10 MIN: CPT | Mod: 25

## 2024-12-06 PROCEDURE — 36415 COLL VENOUS BLD VENIPUNCTURE: CPT | Performed by: OBSTETRICS & GYNECOLOGY

## 2024-12-06 PROCEDURE — 82570 ASSAY OF URINE CREATININE: CPT | Performed by: OBSTETRICS & GYNECOLOGY

## 2024-12-06 PROCEDURE — 0501F PRENATAL FLOW SHEET: CPT | Performed by: STUDENT IN AN ORGANIZED HEALTH CARE EDUCATION/TRAINING PROGRAM

## 2024-12-06 PROCEDURE — 85027 COMPLETE CBC AUTOMATED: CPT | Performed by: OBSTETRICS & GYNECOLOGY

## 2024-12-06 RX ORDER — LIDOCAINE HYDROCHLORIDE 10 MG/ML
0.5 INJECTION, SOLUTION EPIDURAL; INFILTRATION; INTRACAUDAL; PERINEURAL ONCE AS NEEDED
Status: DISCONTINUED | OUTPATIENT
Start: 2024-12-06 | End: 2024-12-06 | Stop reason: HOSPADM

## 2024-12-06 RX ORDER — LABETALOL HYDROCHLORIDE 5 MG/ML
20 INJECTION, SOLUTION INTRAVENOUS ONCE AS NEEDED
Status: DISCONTINUED | OUTPATIENT
Start: 2024-12-06 | End: 2024-12-06 | Stop reason: HOSPADM

## 2024-12-06 RX ORDER — NIFEDIPINE 10 MG/1
10 CAPSULE ORAL ONCE AS NEEDED
Status: DISCONTINUED | OUTPATIENT
Start: 2024-12-06 | End: 2024-12-06 | Stop reason: HOSPADM

## 2024-12-06 RX ORDER — ONDANSETRON HYDROCHLORIDE 2 MG/ML
4 INJECTION, SOLUTION INTRAVENOUS EVERY 6 HOURS PRN
Status: DISCONTINUED | OUTPATIENT
Start: 2024-12-06 | End: 2024-12-06 | Stop reason: HOSPADM

## 2024-12-06 RX ORDER — HYDRALAZINE HYDROCHLORIDE 20 MG/ML
5 INJECTION INTRAMUSCULAR; INTRAVENOUS ONCE AS NEEDED
Status: DISCONTINUED | OUTPATIENT
Start: 2024-12-06 | End: 2024-12-06 | Stop reason: HOSPADM

## 2024-12-06 RX ORDER — ONDANSETRON 4 MG/1
4 TABLET, FILM COATED ORAL EVERY 6 HOURS PRN
Status: DISCONTINUED | OUTPATIENT
Start: 2024-12-06 | End: 2024-12-06 | Stop reason: HOSPADM

## 2024-12-06 SDOH — SOCIAL STABILITY: SOCIAL INSECURITY: ABUSE SCREEN: ADULT

## 2024-12-06 SDOH — HEALTH STABILITY: MENTAL HEALTH: HAVE YOU USED ANY PRESCRIPTION DRUGS OTHER THAN PRESCRIBED IN THE PAST 12 MONTHS?: NO

## 2024-12-06 SDOH — SOCIAL STABILITY: SOCIAL INSECURITY: VERBAL ABUSE: DENIES

## 2024-12-06 SDOH — ECONOMIC STABILITY: HOUSING INSECURITY: DO YOU FEEL UNSAFE GOING BACK TO THE PLACE WHERE YOU ARE LIVING?: NO

## 2024-12-06 SDOH — SOCIAL STABILITY: SOCIAL INSECURITY: DO YOU FEEL ANYONE HAS EXPLOITED OR TAKEN ADVANTAGE OF YOU FINANCIALLY OR OF YOUR PERSONAL PROPERTY?: NO

## 2024-12-06 SDOH — SOCIAL STABILITY: SOCIAL INSECURITY: DOES ANYONE TRY TO KEEP YOU FROM HAVING/CONTACTING OTHER FRIENDS OR DOING THINGS OUTSIDE YOUR HOME?: NO

## 2024-12-06 SDOH — HEALTH STABILITY: MENTAL HEALTH: SUICIDAL BEHAVIOR (LIFETIME): NO

## 2024-12-06 SDOH — SOCIAL STABILITY: SOCIAL INSECURITY: ARE YOU OR HAVE YOU BEEN THREATENED OR ABUSED PHYSICALLY, EMOTIONALLY, OR SEXUALLY BY ANYONE?: NO

## 2024-12-06 SDOH — HEALTH STABILITY: MENTAL HEALTH: HAVE YOU USED ANY SUBSTANCES (CANABIS, COCAINE, HEROIN, HALLUCINOGENS, INHALANTS, ETC.) IN THE PAST 12 MONTHS?: NO

## 2024-12-06 SDOH — HEALTH STABILITY: MENTAL HEALTH: WERE YOU ABLE TO COMPLETE ALL THE BEHAVIORAL HEALTH SCREENINGS?: YES

## 2024-12-06 SDOH — SOCIAL STABILITY: SOCIAL INSECURITY: PHYSICAL ABUSE: DENIES

## 2024-12-06 SDOH — SOCIAL STABILITY: SOCIAL INSECURITY: HAVE YOU HAD THOUGHTS OF HARMING ANYONE ELSE?: NO

## 2024-12-06 SDOH — HEALTH STABILITY: MENTAL HEALTH: NON-SPECIFIC ACTIVE SUICIDAL THOUGHTS (PAST 1 MONTH): NO

## 2024-12-06 SDOH — SOCIAL STABILITY: SOCIAL INSECURITY: ARE THERE ANY APPARENT SIGNS OF INJURIES/BEHAVIORS THAT COULD BE RELATED TO ABUSE/NEGLECT?: NO

## 2024-12-06 SDOH — SOCIAL STABILITY: SOCIAL INSECURITY: HAS ANYONE EVER THREATENED TO HURT YOUR FAMILY OR YOUR PETS?: NO

## 2024-12-06 SDOH — SOCIAL STABILITY: SOCIAL INSECURITY: HAVE YOU HAD ANY THOUGHTS OF HARMING ANYONE ELSE?: NO

## 2024-12-06 SDOH — HEALTH STABILITY: MENTAL HEALTH: WISH TO BE DEAD (PAST 1 MONTH): NO

## 2024-12-06 ASSESSMENT — PATIENT HEALTH QUESTIONNAIRE - PHQ9
2. FEELING DOWN, DEPRESSED OR HOPELESS: NOT AT ALL
SUM OF ALL RESPONSES TO PHQ9 QUESTIONS 1 & 2: 0
1. LITTLE INTEREST OR PLEASURE IN DOING THINGS: NOT AT ALL

## 2024-12-06 ASSESSMENT — PAIN SCALES - GENERAL: PAINLEVEL_OUTOF10: 0 - NO PAIN

## 2024-12-06 ASSESSMENT — LIFESTYLE VARIABLES
SKIP TO QUESTIONS 9-10: 1
AUDIT-C TOTAL SCORE: 0
AUDIT-C TOTAL SCORE: 0
HOW MANY STANDARD DRINKS CONTAINING ALCOHOL DO YOU HAVE ON A TYPICAL DAY: PATIENT DOES NOT DRINK
HOW OFTEN DO YOU HAVE A DRINK CONTAINING ALCOHOL: NEVER
HOW OFTEN DO YOU HAVE 6 OR MORE DRINKS ON ONE OCCASION: NEVER

## 2024-12-06 NOTE — H&P
Obstetrical Admission History and Physical     Nancy Contreras is a 39 y.o.  at 36w6d. BRANDT: 2024, by Last Menstrual Period. Estimated fetal weight: 6 lb . She has had prenatal care with Dr Franklin  .    Chief Complaint: HTN     Assessment/Plan    Has DX GTHN   One severe BP in office. Normal BPs here.    Will be induced at 37 weeks in am for GHTN.  To check home BP 2 x     Has no HA, swelling or abd pain. Call parameters given   NST   FHR reactive, baseline 150s , no decels, at least 2 accelerations noted.  Audible fetal mvmts.  No ctxs.     Cyndi Mcintyre MD           Assessment & Plan        Pregnancy Problems (from 24 to present)       Problem Noted Diagnosed Resolved    Gestational hypertension, third trimester ([facility]) 12/3/2024 by Aliza Franklin MD  No    Priority:  Medium       Overview Addendum 2024  8:56 AM by Aliza Franklin MD     -Diagnosed at 36w6d by elevated BPs at two appointments  -Isolated severe range BP in office on , ASHUTOSH 16.5 at that time  -To L&D         36 weeks gestation of pregnancy ([facility]) 2024 by Aliza Franklin MD  No    Priority:  Medium       Overview Addendum 2024  3:21 PM by Aliza Franklin MD     Last growth (10/21): EFW 83%, AC 87%, HC 31%    Desired provider in labor: [] CNM  [x] Physician  [x] Blood Products: [x] Yes, accepts [] No, needs counseling  [x] Initial BMI: 20.38   [x] Prenatal Labs: A1c 5.7%, early 3 hr GTT with 1/4 values above range  [x] Cervical Cancer Screening: pap NIL/HRHPV neg (2021)  [x] Rh status: positive  [x] Genetic Screening: risk-reducing cfDNA  [x] NT US (11-13 wks): WNL  [x] Baby ASA (if indicated): 162 mg (h/o gHTN)  [x] Pregnancy dated by: LMP c/w 13 week scan    [x] Anatomy US (19-20 wks): WNL  [x] Federal Sterilization consent signed (if indicated): N/A (private insurance)  [x] 1hr GCT at 24-28wks: early 3 hour with 1/4 values above range, repeat 3 hour with 1/4 values above range -> no GDM  []  Fetal Surveillance (if indicated):   [x] Tdap (27-32 wks, may be given up to 36 wks if initial window missed): given 10/10/24  [x] RSV (32-36 wks) (Sept. to end ): declines  [x] Flu Vaccine: declines  [x] COVID Vaccine: declines    [x] Breastfeeding: yes, has pump  [x] Postpartum Birth control method: interval Mirena  [] GBS at 36 - 37 wks:  [x] 39 weeks discussion of IOL vs. Expectant management: expectant but open to IOL by 41wga  [x] Mode of delivery ( anticipated ):                Admit for inpatient care.  Diagnosis:   Diagnosed based on: elevated blood pressures  Blood pressure goal: <160/110  Short acting medications received? No   Long acting antihypertensive: no  HELLP labs: normal   Protein:Cr ratio: 0.16  Delivery planning: IOL in am . VAG    corticosteroids: not indicated  Magnesium for seizure prophylaxis: not indicated  GBS prophylaxis: not indicated    Subjective   Nancy is here complaining of high blood pressure.  Hypertension symptoms:  none    Good fetal movement. Denies vaginal bleeding., Denies contractions., Denies leaking of fluid.            Obstetrical History   OB History    Para Term  AB Living   4 1 1   2 1   SAB IAB Ectopic Multiple Live Births   2       1      # Outcome Date GA Lbr David/2nd Weight Sex Type Anes PTL Lv   4 Current            3 Term 22 41w2d  3.005 kg F Vag-Spont EPI N MARIEL   2 SAB 2020     Complete  N    1 SAB      Complete  N        Past Medical History  Past Medical History:   Diagnosis Date    Abscess of breast associated with the puerperium ([facility]) 2022    Abscess, breast, obstetric, postpartum condition    Nonpurulent mastitis associated with the puerperium ([facility]) 2022    Postpartum mastitis    Personal history of other diseases of the female genital tract 2022    History of breast pain    Personal history of pulmonary embolism 2022    History of pulmonary embolism        Past Surgical History    Past Surgical History:   Procedure Laterality Date    OTHER SURGICAL HISTORY  06/20/2022    [place] tooth extraction       Social History  Social History     Tobacco Use    Smoking status: Not on file    Smokeless tobacco: Not on file   Substance Use Topics    Alcohol use: Not on file     Substance and Sexual Activity   Drug Use Not on file       Allergies  Penicillins     Medications  Medications Prior to Admission   Medication Sig Dispense Refill Last Dose/Taking    aspirin 81 mg EC tablet Take 2 tablets (162 mg) by mouth once daily. Start taking at 12 weeks and continue until you have your baby 180 tablet 2 12/5/2024 Bedtime    prenatal vit 93/iron fum/folic (PRENATAL FORMULA ORAL) Take 1 tablet by mouth once daily.   12/5/2024 Bedtime    doxylamine (Unisom, doxylamine,) 25 mg tablet Take 1 tablet (25 mg) by mouth as needed at bedtime for sleep or nausea. 30 tablet 1        Objective    Last Vitals  Temp Pulse Resp BP MAP O2 Sat   37.3 °C (99.1 °F) 77 16 134/66 93 (!) 95 %     Physical Examination  GENERAL: Examination reveals a well developed, well nourished, gravid female in no acute distress. She is alert and cooperative.  ABDOMEN: soft, gravid, nontender, nondistended, no abnormal masses, no epigastric pain  EXTREMITIES: no redness or tenderness in the calves or thighs, no edema  SKIN: normal coloration and turgor, no rashes    Lab Review  Labs in chart were reviewed.

## 2024-12-06 NOTE — PROGRESS NOTES
Subjective   Patient ID [id number]   Nancy Contreras is a 39 y.o.  at 36w6d with a working estimated date of delivery of 2024, by Last Menstrual Period who presents for a routine prenatal visit. Good FM, no OB complaints. Denies HA, scotoma, SOB, or RUQ pain.     Objective   Physical Exam  Vitals:    24 0842   BP: 134/80      Weight: 67.9 kg (149 lb 12.8 oz), Pregravid BMI: 20.38  Expected Total Weight Gain: 11.5 kg (25 lb)-16 kg (35 lb)   Fundal height and FHR per prenatal flowsheet    Assessment/Plan     Nancy Contreras is a 39 y.o.  at 36w6d with a working estimated date of delivery of 2024, by Last Menstrual Period who presents for a routine prenatal visit.    Severe range BP that was normal on recheck. Has now had two elevated BPs > 4 hours apart so meets criteria for at least gHTN. Currently asymptomatic. ASHUTOSH 16.5, vertex. To L&D for sPEC r/o. Did discuss transfer to [facility] for Mg and delivery if rules in for severe features. Otherwise will be for IOL tomorrow at 37w0d. Remainder of plan per problem list as below.     Problem List Items Addressed This Visit       36 weeks gestation of pregnancy ([facility])    Overview     Last growth (10/21): EFW 83%, AC 87%, HC 31%    Desired provider in labor: [] CNM  [x] Physician  [x] Blood Products: [x] Yes, accepts [] No, needs counseling  [x] Initial BMI: 20.38   [x] Prenatal Labs: A1c 5.7%, early 3 hr GTT with 1/4 values above range  [x] Cervical Cancer Screening: pap NIL/HRHPV neg (2021)  [x] Rh status: positive  [x] Genetic Screening: risk-reducing cfDNA  [x] NT US (11-13 wks): WNL  [x] Baby ASA (if indicated): 162 mg (h/o gHTN)  [x] Pregnancy dated by: LMP c/w 13 week scan    [x] Anatomy US (19-20 wks): WNL  [x] Federal Sterilization consent signed (if indicated): N/A (private insurance)  [x] 1hr GCT at 24-28wks: early 3 hour with 1/4 values above range, repeat 3 hour with 1/4 values above range -> no GDM  [] Fetal Surveillance (if  indicated):   [x] Tdap (27-32 wks, may be given up to 36 wks if initial window missed): given 10/10/24  [x] RSV (32-36 wks) (Sept. to end ): declines  [x] Flu Vaccine: declines  [x] COVID Vaccine: declines    [x] Breastfeeding: yes, has pump  [x] Postpartum Birth control method: interval Mirena  [] GBS at 36 - 37 wks:  [x] 39 weeks discussion of IOL vs. Expectant management: expectant but open to IOL by 41wga  [x] Mode of delivery ( anticipated ):          Gestational hypertension, third trimester ([facility]) - Primary    Overview     -Diagnosed at 36w6d by elevated BPs at two appointments  -Isolated severe range BP in office on , ASHUTOSH 16.5 at that time  -To L&D          Follow-up postpartum    Aliza Franklin MD

## 2024-12-07 ENCOUNTER — ANESTHESIA EVENT (OUTPATIENT)
Dept: OBSTETRICS AND GYNECOLOGY | Facility: HOSPITAL | Age: 39
End: 2024-12-07
Payer: COMMERCIAL

## 2024-12-07 ENCOUNTER — HOSPITAL ENCOUNTER (INPATIENT)
Facility: HOSPITAL | Age: 39
LOS: 2 days | Discharge: HOME | End: 2024-12-09
Attending: OBSTETRICS & GYNECOLOGY | Admitting: OBSTETRICS & GYNECOLOGY
Payer: COMMERCIAL

## 2024-12-07 ENCOUNTER — APPOINTMENT (OUTPATIENT)
Dept: OBSTETRICS AND GYNECOLOGY | Facility: HOSPITAL | Age: 39
End: 2024-12-07
Payer: COMMERCIAL

## 2024-12-07 ENCOUNTER — ANESTHESIA (OUTPATIENT)
Dept: OBSTETRICS AND GYNECOLOGY | Facility: HOSPITAL | Age: 39
End: 2024-12-07
Payer: COMMERCIAL

## 2024-12-07 LAB
ABO GROUP (TYPE) IN BLOOD: NORMAL
ALBUMIN SERPL BCP-MCNC: 3.3 G/DL (ref 3.4–5)
ALP SERPL-CCNC: 122 U/L (ref 33–110)
ALT SERPL W P-5'-P-CCNC: 9 U/L (ref 7–45)
ANION GAP SERPL CALC-SCNC: 13 MMOL/L (ref 10–20)
ANTIBODY SCREEN: NORMAL
AST SERPL W P-5'-P-CCNC: 16 U/L (ref 9–39)
BILIRUB SERPL-MCNC: 0.9 MG/DL (ref 0–1.2)
BUN SERPL-MCNC: 10 MG/DL (ref 6–23)
CALCIUM SERPL-MCNC: 8.4 MG/DL (ref 8.6–10.3)
CHLORIDE SERPL-SCNC: 106 MMOL/L (ref 98–107)
CO2 SERPL-SCNC: 20 MMOL/L (ref 21–32)
CREAT SERPL-MCNC: 0.62 MG/DL (ref 0.5–1.05)
CREAT UR-MCNC: 169.1 MG/DL (ref 20–320)
EGFRCR SERPLBLD CKD-EPI 2021: >90 ML/MIN/1.73M*2
ERYTHROCYTE [DISTWIDTH] IN BLOOD BY AUTOMATED COUNT: 13 % (ref 11.5–14.5)
GLUCOSE SERPL-MCNC: 100 MG/DL (ref 74–99)
GP B STREP GENITAL QL CULT: ABNORMAL
HCT VFR BLD AUTO: 40 % (ref 36–46)
HGB BLD-MCNC: 13.3 G/DL (ref 12–16)
LDH SERPL L TO P-CCNC: 125 U/L (ref 84–246)
MCH RBC QN AUTO: 31.1 PG (ref 26–34)
MCHC RBC AUTO-ENTMCNC: 33.3 G/DL (ref 32–36)
MCV RBC AUTO: 94 FL (ref 80–100)
NRBC BLD-RTO: 0 /100 WBCS (ref 0–0)
PLATELET # BLD AUTO: 162 X10*3/UL (ref 150–450)
POTASSIUM SERPL-SCNC: 3.7 MMOL/L (ref 3.5–5.3)
PROT SERPL-MCNC: 6.5 G/DL (ref 6.4–8.2)
PROT UR-ACNC: 20 MG/DL (ref 5–24)
PROT/CREAT UR: 0.12 MG/MG CREAT (ref 0–0.17)
RBC # BLD AUTO: 4.27 X10*6/UL (ref 4–5.2)
RH FACTOR (ANTIGEN D): NORMAL
SODIUM SERPL-SCNC: 135 MMOL/L (ref 136–145)
URATE SERPL-MCNC: 4.4 MG/DL (ref 2.3–6.7)
WBC # BLD AUTO: 11.2 X10*3/UL (ref 4.4–11.3)

## 2024-12-07 PROCEDURE — 80053 COMPREHEN METABOLIC PANEL: CPT | Performed by: OBSTETRICS & GYNECOLOGY

## 2024-12-07 PROCEDURE — 2500000004 HC RX 250 GENERAL PHARMACY W/ HCPCS (ALT 636 FOR OP/ED): Performed by: OBSTETRICS & GYNECOLOGY

## 2024-12-07 PROCEDURE — 59050 FETAL MONITOR W/REPORT: CPT

## 2024-12-07 PROCEDURE — 2500000001 HC RX 250 WO HCPCS SELF ADMINISTERED DRUGS (ALT 637 FOR MEDICARE OP): Performed by: OBSTETRICS & GYNECOLOGY

## 2024-12-07 PROCEDURE — 86901 BLOOD TYPING SEROLOGIC RH(D): CPT | Performed by: OBSTETRICS & GYNECOLOGY

## 2024-12-07 PROCEDURE — 3E0DXGC INTRODUCTION OF OTHER THERAPEUTIC SUBSTANCE INTO MOUTH AND PHARYNX, EXTERNAL APPROACH: ICD-10-PCS | Performed by: OBSTETRICS & GYNECOLOGY

## 2024-12-07 PROCEDURE — 86780 TREPONEMA PALLIDUM: CPT | Mod: STJLAB | Performed by: OBSTETRICS & GYNECOLOGY

## 2024-12-07 PROCEDURE — 7210000002 HC LABOR PER HOUR

## 2024-12-07 PROCEDURE — 84550 ASSAY OF BLOOD/URIC ACID: CPT | Performed by: OBSTETRICS & GYNECOLOGY

## 2024-12-07 PROCEDURE — 2500000004 HC RX 250 GENERAL PHARMACY W/ HCPCS (ALT 636 FOR OP/ED): Performed by: NURSE ANESTHETIST, CERTIFIED REGISTERED

## 2024-12-07 PROCEDURE — 82570 ASSAY OF URINE CREATININE: CPT | Performed by: OBSTETRICS & GYNECOLOGY

## 2024-12-07 PROCEDURE — 85027 COMPLETE CBC AUTOMATED: CPT | Performed by: OBSTETRICS & GYNECOLOGY

## 2024-12-07 PROCEDURE — 10907ZC DRAINAGE OF AMNIOTIC FLUID, THERAPEUTIC FROM PRODUCTS OF CONCEPTION, VIA NATURAL OR ARTIFICIAL OPENING: ICD-10-PCS | Performed by: STUDENT IN AN ORGANIZED HEALTH CARE EDUCATION/TRAINING PROGRAM

## 2024-12-07 PROCEDURE — 3E033VJ INTRODUCTION OF OTHER HORMONE INTO PERIPHERAL VEIN, PERCUTANEOUS APPROACH: ICD-10-PCS | Performed by: STUDENT IN AN ORGANIZED HEALTH CARE EDUCATION/TRAINING PROGRAM

## 2024-12-07 PROCEDURE — 36415 COLL VENOUS BLD VENIPUNCTURE: CPT | Performed by: OBSTETRICS & GYNECOLOGY

## 2024-12-07 PROCEDURE — 3E0P7VZ INTRODUCTION OF HORMONE INTO FEMALE REPRODUCTIVE, VIA NATURAL OR ARTIFICIAL OPENING: ICD-10-PCS | Performed by: OBSTETRICS & GYNECOLOGY

## 2024-12-07 PROCEDURE — 1120000001 HC OB PRIVATE ROOM DAILY

## 2024-12-07 PROCEDURE — 83615 LACTATE (LD) (LDH) ENZYME: CPT | Performed by: OBSTETRICS & GYNECOLOGY

## 2024-12-07 PROCEDURE — 01967 NEURAXL LBR ANES VAG DLVR: CPT | Performed by: NURSE ANESTHETIST, CERTIFIED REGISTERED

## 2024-12-07 PROCEDURE — 3700000014 EPIDURAL BLOCK: Performed by: NURSE ANESTHETIST, CERTIFIED REGISTERED

## 2024-12-07 RX ORDER — METHYLERGONOVINE MALEATE 0.2 MG/ML
0.2 INJECTION INTRAVENOUS ONCE AS NEEDED
Status: DISCONTINUED | OUTPATIENT
Start: 2024-12-07 | End: 2024-12-08 | Stop reason: HOSPADM

## 2024-12-07 RX ORDER — OXYTOCIN 10 [USP'U]/ML
10 INJECTION, SOLUTION INTRAMUSCULAR; INTRAVENOUS ONCE AS NEEDED
Status: DISCONTINUED | OUTPATIENT
Start: 2024-12-07 | End: 2024-12-08 | Stop reason: HOSPADM

## 2024-12-07 RX ORDER — TRANEXAMIC ACID 100 MG/ML
1000 INJECTION, SOLUTION INTRAVENOUS ONCE AS NEEDED
Status: DISCONTINUED | OUTPATIENT
Start: 2024-12-07 | End: 2024-12-08 | Stop reason: HOSPADM

## 2024-12-07 RX ORDER — MISOPROSTOL 200 UG/1
800 TABLET ORAL ONCE AS NEEDED
Status: DISCONTINUED | OUTPATIENT
Start: 2024-12-07 | End: 2024-12-08 | Stop reason: HOSPADM

## 2024-12-07 RX ORDER — LOPERAMIDE HYDROCHLORIDE 2 MG/1
4 CAPSULE ORAL EVERY 2 HOUR PRN
Status: DISCONTINUED | OUTPATIENT
Start: 2024-12-07 | End: 2024-12-08 | Stop reason: HOSPADM

## 2024-12-07 RX ORDER — LABETALOL HYDROCHLORIDE 5 MG/ML
20 INJECTION, SOLUTION INTRAVENOUS ONCE AS NEEDED
Status: DISCONTINUED | OUTPATIENT
Start: 2024-12-07 | End: 2024-12-08 | Stop reason: HOSPADM

## 2024-12-07 RX ORDER — CARBOPROST TROMETHAMINE 250 UG/ML
250 INJECTION, SOLUTION INTRAMUSCULAR ONCE AS NEEDED
Status: DISCONTINUED | OUTPATIENT
Start: 2024-12-07 | End: 2024-12-08 | Stop reason: HOSPADM

## 2024-12-07 RX ORDER — OXYTOCIN/0.9 % SODIUM CHLORIDE 30/500 ML
60 PLASTIC BAG, INJECTION (ML) INTRAVENOUS ONCE AS NEEDED
Status: DISCONTINUED | OUTPATIENT
Start: 2024-12-07 | End: 2024-12-08 | Stop reason: HOSPADM

## 2024-12-07 RX ORDER — OXYTOCIN/0.9 % SODIUM CHLORIDE 30/500 ML
2-30 PLASTIC BAG, INJECTION (ML) INTRAVENOUS CONTINUOUS
Status: DISCONTINUED | OUTPATIENT
Start: 2024-12-07 | End: 2024-12-08

## 2024-12-07 RX ORDER — ONDANSETRON HYDROCHLORIDE 2 MG/ML
4 INJECTION, SOLUTION INTRAVENOUS EVERY 6 HOURS PRN
Status: DISCONTINUED | OUTPATIENT
Start: 2024-12-07 | End: 2024-12-08 | Stop reason: HOSPADM

## 2024-12-07 RX ORDER — TERBUTALINE SULFATE 1 MG/ML
0.25 INJECTION SUBCUTANEOUS ONCE AS NEEDED
Status: DISCONTINUED | OUTPATIENT
Start: 2024-12-07 | End: 2024-12-08 | Stop reason: HOSPADM

## 2024-12-07 RX ORDER — LIDOCAINE HYDROCHLORIDE 10 MG/ML
30 INJECTION, SOLUTION INFILTRATION; PERINEURAL ONCE AS NEEDED
Status: DISCONTINUED | OUTPATIENT
Start: 2024-12-07 | End: 2024-12-08 | Stop reason: HOSPADM

## 2024-12-07 RX ORDER — FENTANYL/ROPIVACAINE/NS/PF 2MCG/ML-.2
0-25 PLASTIC BAG, INJECTION (ML) INJECTION CONTINUOUS
Status: DISCONTINUED | OUTPATIENT
Start: 2024-12-07 | End: 2024-12-08

## 2024-12-07 RX ORDER — SODIUM CHLORIDE 9 MG/ML
125 INJECTION, SOLUTION INTRAVENOUS CONTINUOUS
Status: DISCONTINUED | OUTPATIENT
Start: 2024-12-07 | End: 2024-12-08

## 2024-12-07 RX ORDER — NIFEDIPINE 10 MG/1
10 CAPSULE ORAL ONCE AS NEEDED
Status: DISCONTINUED | OUTPATIENT
Start: 2024-12-07 | End: 2024-12-08 | Stop reason: HOSPADM

## 2024-12-07 RX ORDER — HYDRALAZINE HYDROCHLORIDE 20 MG/ML
5 INJECTION INTRAMUSCULAR; INTRAVENOUS ONCE AS NEEDED
Status: DISCONTINUED | OUTPATIENT
Start: 2024-12-07 | End: 2024-12-08 | Stop reason: HOSPADM

## 2024-12-07 RX ORDER — METOCLOPRAMIDE HYDROCHLORIDE 5 MG/ML
10 INJECTION INTRAMUSCULAR; INTRAVENOUS EVERY 6 HOURS PRN
Status: DISCONTINUED | OUTPATIENT
Start: 2024-12-07 | End: 2024-12-08 | Stop reason: HOSPADM

## 2024-12-07 RX ORDER — METOCLOPRAMIDE 10 MG/1
10 TABLET ORAL EVERY 6 HOURS PRN
Status: DISCONTINUED | OUTPATIENT
Start: 2024-12-07 | End: 2024-12-08 | Stop reason: HOSPADM

## 2024-12-07 RX ORDER — ONDANSETRON 4 MG/1
4 TABLET, FILM COATED ORAL EVERY 6 HOURS PRN
Status: DISCONTINUED | OUTPATIENT
Start: 2024-12-07 | End: 2024-12-08 | Stop reason: HOSPADM

## 2024-12-07 RX ADMIN — LIDOCAINE HYDROCHLORIDE AND EPINEPHRINE 3 ML: 15; 5 INJECTION, SOLUTION EPIDURAL at 22:55

## 2024-12-07 RX ADMIN — Medication 5 ML: at 22:59

## 2024-12-07 RX ADMIN — Medication 8 ML/HR: at 23:01

## 2024-12-07 SDOH — HEALTH STABILITY: MENTAL HEALTH: CURRENT SMOKER: 0

## 2024-12-07 ASSESSMENT — PAIN SCALES - GENERAL
PAINLEVEL_OUTOF10: 0 - NO PAIN
PAINLEVEL_OUTOF10: 8
PAINLEVEL_OUTOF10: 5 - MODERATE PAIN
PAINLEVEL_OUTOF10: 2
PAINLEVEL_OUTOF10: 5 - MODERATE PAIN
PAINLEVEL_OUTOF10: 0 - NO PAIN
PAINLEVEL_OUTOF10: 5 - MODERATE PAIN
PAINLEVEL_OUTOF10: 0 - NO PAIN
PAINLEVEL_OUTOF10: 5 - MODERATE PAIN
PAINLEVEL_OUTOF10: 0 - NO PAIN
PAINLEVEL_OUTOF10: 8
PAINLEVEL_OUTOF10: 0 - NO PAIN

## 2024-12-07 NOTE — PROGRESS NOTES
Patient kenneth every 2-3 minutes with first vaginal Cytotec, so cramping felt, will give 2nd dose orally

## 2024-12-07 NOTE — CARE PLAN
The patient's goals for the shift include Start labor    The clinical goals for the shift include FHR remains reassuring during labor      Problem: Vaginal Birth or  Section  Goal: Fetal and maternal status remain reassuring during the birth process  Outcome: Progressing  Flowsheets (Taken 2024)  Fetal and maternal status remain reassuring during the birth process:   Monitor vital signs   Monitor labor progression (Vaginal delivery)   Deep vein thrombosis prophylaxis   Monitor fetal heart rate   Monitor uterine activity   Med administration/monitoring of effect  Goal: Demonstrates labor coping techniques through delivery  Outcome: Progressing  Flowsheets (Taken 2024)  Demonstrates labor coping techniques through delivery:   Positioning, turning, and/or use of birthing ball assistance   Breathing/relaxation assistance  Goal: Minimal s/sx of HDP and BP<160/110  Outcome: Progressing  Flowsheets (Taken 2024)  Minimal s/sx of HDP and BP <160/110:   Med administration/monitoring of effect   Monitor QBL and vital signs     Problem: Hypertensive Disorder of Pregnancy (HDP)  Goal: Minimal s/sx of HDP and BP<160/110  Outcome: Progressing  Flowsheets (Taken 2024)  Minimal s/sx of HDP and BP <160/110:   Med administration/monitoring of effect   Monitor for s/sx of worsening HDP     Problem: Pain - Adult  Goal: Verbalizes/displays adequate comfort level or baseline comfort level  Outcome: Progressing  Flowsheets (Taken 2024)  Verbalizes/displays adequate comfort level or baseline comfort level:   Encourage patient to monitor pain and request assistance   Assess pain using appropriate pain scale   Administer analgesics based on type and severity of pain and evaluate response   Implement non-pharmacological measures as appropriate and evaluate response   Consider cultural and social influences on pain and pain management   Notify Licensed Independent Practitioner if  interventions unsuccessful or patient reports new pain     Problem: Safety - Adult  Goal: Free from fall injury  Outcome: Progressing  Flowsheets (Taken 12/7/2024 0830)  Free from fall injury:   Instruct family/caregiver on patient safety   Based on caregiver fall risk screen, instruct family/caregiver to ask for assistance with transferring infant if caregiver noted to have fall risk factors     Problem: Discharge Planning  Goal: Discharge to home or other facility with appropriate resources  Outcome: Progressing  Flowsheets (Taken 12/7/2024 0830)  Discharge to home or other facility with appropriate resources:   Identify barriers to discharge with patient and caregiver   Arrange for needed discharge resources and transportation as appropriate   Identify discharge learning needs (meds, wound care, etc)   Arrange for interpreters to assist at discharge as needed   Refer to discharge planning if patient needs post-hospital services based on physician order or complex needs related to functional status, cognitive ability or social support system

## 2024-12-07 NOTE — H&P
OB Admission H&P    Assessment/Plan    Nancy Contreras is a 39 y.o.  at 37w0d, BRANDT: 2024, by Last Menstrual Period, who presents for Induction of Labor for GHTN    Plan  -Admit to L&D, consented  -T&S, CBC, CMP. P:C ratio, and Syphilis  -Epidural at patient request  -Recheck as clinically indicated by maternal or fetal status  -Plan to initiate induction with Cytotec    Fetal Status  -NST reactive, reassuring   -Presentation vertex based on vaginal exam  -GBS Pending - GBS Negative last pregnancy will not treat as no risk factors    Postpartum  Contraception Plan:  None    Pregnancy Problems (from 24 to present)       Problem Noted Diagnosed Resolved    Gestational hypertension, third trimester ([facility]) 12/3/2024 by Aliza Franklin MD  No    Priority:  Medium       Overview Addendum 2024  8:56 AM by Aliza Franklin MD     -Diagnosed at 36w6d by elevated BPs at two appointments  -Isolated severe range BP in office on , ASHUTOSH 16.5 at that time  -To L&D         36 weeks gestation of pregnancy ([facility]) 2024 by Aliza Franklin MD  No    Priority:  Medium       Overview Addendum 2024  3:21 PM by Aliza Franklin MD     Last growth (10/21): EFW 83%, AC 87%, HC 31%    Desired provider in labor: [] CNM  [x] Physician  [x] Blood Products: [x] Yes, accepts [] No, needs counseling  [x] Initial BMI: 20.38   [x] Prenatal Labs: A1c 5.7%, early 3 hr GTT with 1/4 values above range  [x] Cervical Cancer Screening: pap NIL/HRHPV neg (2021)  [x] Rh status: positive  [x] Genetic Screening: risk-reducing cfDNA  [x] NT US (11-13 wks): WNL  [x] Baby ASA (if indicated): 162 mg (h/o gHTN)  [x] Pregnancy dated by: LMP c/w 13 week scan    [x] Anatomy US (19-20 wks): WNL  [x] Federal Sterilization consent signed (if indicated): N/A (private insurance)  [x] 1hr GCT at 24-28wks: early 3 hour with 1/4 values above range, repeat 3 hour with 1/4 values above range -> no GDM  [] Fetal Surveillance (if  indicated):   [x] Tdap (27-32 wks, may be given up to 36 wks if initial window missed): given 10/10/24  [x] RSV (32-36 wks) (Sept. to end of ): declines  [x] Flu Vaccine: declines  [x] COVID Vaccine: declines    [x] Breastfeeding: yes, has pump  [x] Postpartum Birth control method: interval Mirena  [] GBS at 36 - 37 wks:  [x] 39 weeks discussion of IOL vs. Expectant management: expectant but open to IOL by 41wga  [x] Mode of delivery ( anticipated ):                  Subjective   Good fetal movement.  Denies vaginal bleeding., Denies contractions., Denies leaking of fluid.      Prenatal Provider Miguelina    OB History    Para Term  AB Living   4 1 1 0 2 1   SAB IAB Ectopic Multiple Live Births   2 0 0 0 1      # Outcome Date GA Lbr David/2nd Weight Sex Type Anes PTL Lv   4 Current            3 Term 22 41w2d  3.005 kg F Vag-Spont EPI N MARIEL      Name: [place]   2 SAB 2020     Complete  N    1 SAB      Complete  N        Past Surgical History:   Procedure Laterality Date    OTHER SURGICAL HISTORY  2022    [place] tooth extraction       Social History     Tobacco Use    Smoking status: Never    Smokeless tobacco: Never   Substance Use Topics    Alcohol use: Not on file       Allergies   Allergen Reactions    Penicillins Hives and Unknown     Hives, throat swelling    Possible hives/rash on her chest.       Medications Prior to Admission   Medication Sig Dispense Refill Last Dose/Taking    aspirin 81 mg EC tablet Take 2 tablets (162 mg) by mouth once daily. Start taking at 12 weeks and continue until you have your baby 180 tablet 2     doxylamine (Unisom, doxylamine,) 25 mg tablet Take 1 tablet (25 mg) by mouth as needed at bedtime for sleep or nausea. 30 tablet 1     prenatal vit 93/iron fum/folic (PRENATAL FORMULA ORAL) Take 1 tablet by mouth once daily.        Objective     Last Vitals  Temp Pulse Resp BP MAP O2 Sat   36.6 °C (97.8 °F) 90 16 135/74 97 99 %     Blood Pressures          12/7/2024  0801 12/7/2024  0804 12/7/2024  1206       BP: 119/77 123/80 135/74              Physical Exam  General: NAD, mood appropriate  Cardiopulmonary: warm and well perfused, breathing comfortably on room air  Abdomen: Gravid, non-tender  Extremities: Symmetric  Speculum Exam: deferred  Cervix: Closed /  /       Fetal Monitoring  Baseline: 140 bpm, Variability: moderate,  Accelerations: present     Labs in chart were reviewed.   Results from last 7 days   Lab Units 12/07/24  0903 12/06/24  1014 12/03/24  1443   WBC AUTO x10*3/uL 11.2 10.9 9.5   HEMOGLOBIN g/dL 13.3 12.2 12.1   HEMATOCRIT % 40.0 37.4 37.6   PLATELETS AUTO x10*3/uL 162 149* 145*   AST U/L 16 14 14   ALT U/L 9 9 9   CREATININE mg/dL 0.62 0.57 0.57        Prenatal labs reviewed, not remarkable.

## 2024-12-07 NOTE — ANESTHESIA PREPROCEDURE EVALUATION
Patient: Nancy Contreras    Evaluation Method: In-person visit    Procedure Information    Date: 24  Procedure: Labor Analgesia       39 y.o.  at 37w here for IOL for gHTN.     Labor epidural PRN.      Relevant Problems   Anesthesia (within normal limits)      GYN   (+) Antepartum multigravida of advanced maternal age (HHS-HCC)      Circulatory   (+) Gestational hypertension, third trimester ([facility])      Coag and Thromboembolic   (+) History of pulmonary embolism      Gravid and    (+) History of gestational hypertension       Clinical information reviewed:   Tobacco  Allergies  Meds  Problems  Med Hx  Surg Hx   Fam Hx  Soc   Hx        NPO Detail:  No data recorded     OB/Gyn Evaluation    Present Pregnancy    Patient is pregnant now.  (+) , hypertensive disorder of pregnancy - gestational hypertension   Obstetric History    (+) hypertensive disorder of pregnancy - gestational hypertension              Physical Exam    Airway  Mallampati: I  TM distance: >3 FB  Neck ROM: full     Cardiovascular - normal exam  Rhythm: regular  Rate: normal     Dental - normal exam     Pulmonary - normal exam  Breath sounds clear to auscultation     Abdominal - normal exam    Comments: gravid           Anesthesia Plan    History of general anesthesia?: yes  History of complications of general anesthesia?: no    ASA 2     epidural     The patient is not a current smoker.    Anesthetic plan and risks discussed with patient.  Use of blood products discussed with patient who consented to blood products.      I informed and discussed the risks and benefits of general, spinal and epidural anesthesia with the patient.  The patient expressed her understanding and her questions were answered.  A verbal consent was given by the patient.

## 2024-12-08 VITALS
OXYGEN SATURATION: 95 % | SYSTOLIC BLOOD PRESSURE: 122 MMHG | HEIGHT: 64 IN | TEMPERATURE: 98.6 F | BODY MASS INDEX: 25.22 KG/M2 | DIASTOLIC BLOOD PRESSURE: 79 MMHG | WEIGHT: 147.71 LBS | HEART RATE: 73 BPM | RESPIRATION RATE: 13 BRPM

## 2024-12-08 LAB — TREPONEMA PALLIDUM IGG+IGM AB [PRESENCE] IN SERUM OR PLASMA BY IMMUNOASSAY: NONREACTIVE

## 2024-12-08 PROCEDURE — 51702 INSERT TEMP BLADDER CATH: CPT

## 2024-12-08 PROCEDURE — 7210000002 HC LABOR PER HOUR

## 2024-12-08 PROCEDURE — 7100000016 HC LABOR RECOVERY PER HOUR

## 2024-12-08 PROCEDURE — 0KQM0ZZ REPAIR PERINEUM MUSCLE, OPEN APPROACH: ICD-10-PCS | Performed by: STUDENT IN AN ORGANIZED HEALTH CARE EDUCATION/TRAINING PROGRAM

## 2024-12-08 PROCEDURE — 1220000001 HC OB SEMI-PRIVATE ROOM DAILY

## 2024-12-08 PROCEDURE — 2500000001 HC RX 250 WO HCPCS SELF ADMINISTERED DRUGS (ALT 637 FOR MEDICARE OP): Performed by: STUDENT IN AN ORGANIZED HEALTH CARE EDUCATION/TRAINING PROGRAM

## 2024-12-08 PROCEDURE — 59409 OBSTETRICAL CARE: CPT | Performed by: STUDENT IN AN ORGANIZED HEALTH CARE EDUCATION/TRAINING PROGRAM

## 2024-12-08 RX ORDER — IBUPROFEN 600 MG/1
600 TABLET ORAL EVERY 6 HOURS
Status: DISCONTINUED | OUTPATIENT
Start: 2024-12-08 | End: 2024-12-09 | Stop reason: HOSPADM

## 2024-12-08 RX ORDER — ONDANSETRON 4 MG/1
4 TABLET, FILM COATED ORAL EVERY 6 HOURS PRN
Status: DISCONTINUED | OUTPATIENT
Start: 2024-12-08 | End: 2024-12-09 | Stop reason: HOSPADM

## 2024-12-08 RX ORDER — OXYTOCIN 10 [USP'U]/ML
10 INJECTION, SOLUTION INTRAMUSCULAR; INTRAVENOUS ONCE AS NEEDED
Status: DISCONTINUED | OUTPATIENT
Start: 2024-12-08 | End: 2024-12-09 | Stop reason: HOSPADM

## 2024-12-08 RX ORDER — MISOPROSTOL 200 UG/1
800 TABLET ORAL ONCE AS NEEDED
Status: DISCONTINUED | OUTPATIENT
Start: 2024-12-08 | End: 2024-12-09 | Stop reason: HOSPADM

## 2024-12-08 RX ORDER — POLYETHYLENE GLYCOL 3350 17 G/17G
17 POWDER, FOR SOLUTION ORAL 2 TIMES DAILY PRN
Status: DISCONTINUED | OUTPATIENT
Start: 2024-12-08 | End: 2024-12-09 | Stop reason: HOSPADM

## 2024-12-08 RX ORDER — TRANEXAMIC ACID 100 MG/ML
1000 INJECTION, SOLUTION INTRAVENOUS ONCE AS NEEDED
Status: DISCONTINUED | OUTPATIENT
Start: 2024-12-08 | End: 2024-12-09 | Stop reason: HOSPADM

## 2024-12-08 RX ORDER — OXYTOCIN/0.9 % SODIUM CHLORIDE 30/500 ML
60 PLASTIC BAG, INJECTION (ML) INTRAVENOUS ONCE AS NEEDED
Status: DISCONTINUED | OUTPATIENT
Start: 2024-12-08 | End: 2024-12-09 | Stop reason: HOSPADM

## 2024-12-08 RX ORDER — LIDOCAINE 560 MG/1
1 PATCH PERCUTANEOUS; TOPICAL; TRANSDERMAL
Status: DISCONTINUED | OUTPATIENT
Start: 2024-12-08 | End: 2024-12-09 | Stop reason: HOSPADM

## 2024-12-08 RX ORDER — METHYLERGONOVINE MALEATE 0.2 MG/ML
0.2 INJECTION INTRAVENOUS ONCE AS NEEDED
Status: DISCONTINUED | OUTPATIENT
Start: 2024-12-08 | End: 2024-12-09 | Stop reason: HOSPADM

## 2024-12-08 RX ORDER — LOPERAMIDE HYDROCHLORIDE 2 MG/1
4 CAPSULE ORAL EVERY 2 HOUR PRN
Status: DISCONTINUED | OUTPATIENT
Start: 2024-12-08 | End: 2024-12-09 | Stop reason: HOSPADM

## 2024-12-08 RX ORDER — ACETAMINOPHEN 325 MG/1
975 TABLET ORAL EVERY 6 HOURS
Status: DISCONTINUED | OUTPATIENT
Start: 2024-12-08 | End: 2024-12-09 | Stop reason: HOSPADM

## 2024-12-08 RX ORDER — HYDRALAZINE HYDROCHLORIDE 20 MG/ML
5 INJECTION INTRAMUSCULAR; INTRAVENOUS ONCE AS NEEDED
Status: DISCONTINUED | OUTPATIENT
Start: 2024-12-08 | End: 2024-12-09 | Stop reason: HOSPADM

## 2024-12-08 RX ORDER — LIDOCAINE HYDROCHLORIDE AND EPINEPHRINE 15; 5 MG/ML; UG/ML
INJECTION, SOLUTION EPIDURAL AS NEEDED
Status: DISCONTINUED | OUTPATIENT
Start: 2024-12-07 | End: 2024-12-08

## 2024-12-08 RX ORDER — DIPHENHYDRAMINE HCL 25 MG
25 TABLET ORAL EVERY 6 HOURS PRN
Status: DISCONTINUED | OUTPATIENT
Start: 2024-12-08 | End: 2024-12-09 | Stop reason: HOSPADM

## 2024-12-08 RX ORDER — CARBOPROST TROMETHAMINE 250 UG/ML
250 INJECTION, SOLUTION INTRAMUSCULAR ONCE AS NEEDED
Status: DISCONTINUED | OUTPATIENT
Start: 2024-12-08 | End: 2024-12-09 | Stop reason: HOSPADM

## 2024-12-08 RX ORDER — ADHESIVE BANDAGE
10 BANDAGE TOPICAL
Status: DISCONTINUED | OUTPATIENT
Start: 2024-12-08 | End: 2024-12-09 | Stop reason: HOSPADM

## 2024-12-08 RX ORDER — NIFEDIPINE 10 MG/1
10 CAPSULE ORAL ONCE AS NEEDED
Status: DISCONTINUED | OUTPATIENT
Start: 2024-12-08 | End: 2024-12-09 | Stop reason: HOSPADM

## 2024-12-08 RX ORDER — DIPHENHYDRAMINE HYDROCHLORIDE 50 MG/ML
25 INJECTION INTRAMUSCULAR; INTRAVENOUS EVERY 6 HOURS PRN
Status: DISCONTINUED | OUTPATIENT
Start: 2024-12-08 | End: 2024-12-09 | Stop reason: HOSPADM

## 2024-12-08 RX ORDER — LABETALOL HYDROCHLORIDE 5 MG/ML
20 INJECTION, SOLUTION INTRAVENOUS ONCE AS NEEDED
Status: DISCONTINUED | OUTPATIENT
Start: 2024-12-08 | End: 2024-12-09 | Stop reason: HOSPADM

## 2024-12-08 RX ORDER — SIMETHICONE 80 MG
80 TABLET,CHEWABLE ORAL 4 TIMES DAILY PRN
Status: DISCONTINUED | OUTPATIENT
Start: 2024-12-08 | End: 2024-12-09 | Stop reason: HOSPADM

## 2024-12-08 RX ORDER — BISACODYL 10 MG/1
10 SUPPOSITORY RECTAL DAILY PRN
Status: DISCONTINUED | OUTPATIENT
Start: 2024-12-08 | End: 2024-12-09 | Stop reason: HOSPADM

## 2024-12-08 RX ORDER — ONDANSETRON HYDROCHLORIDE 2 MG/ML
4 INJECTION, SOLUTION INTRAVENOUS EVERY 6 HOURS PRN
Status: DISCONTINUED | OUTPATIENT
Start: 2024-12-08 | End: 2024-12-09 | Stop reason: HOSPADM

## 2024-12-08 RX ORDER — ENOXAPARIN SODIUM 100 MG/ML
40 INJECTION SUBCUTANEOUS EVERY 24 HOURS
Status: DISCONTINUED | OUTPATIENT
Start: 2024-12-08 | End: 2024-12-09 | Stop reason: HOSPADM

## 2024-12-08 ASSESSMENT — PAIN SCALES - GENERAL
PAINLEVEL_OUTOF10: 0 - NO PAIN
PAINLEVEL_OUTOF10: 2
PAINLEVEL_OUTOF10: 0 - NO PAIN
PAINLEVEL_OUTOF10: 3
PAINLEVEL_OUTOF10: 0 - NO PAIN
PAIN_LEVEL: 2
PAINLEVEL_OUTOF10: 0 - NO PAIN

## 2024-12-08 NOTE — PROGRESS NOTES
"Labor Progress Note     Nancy Contreras is a 39 y.o.  at 37w0d who is here for IOL for gHTN.     S: Patient comfortable now s/p epidural      O:  /75   Pulse 99   Temp 36.8 °C (98.2 °F) (Oral)   Resp 18   Ht 1.626 m (5' 4\")   Wt 67 kg (147 lb 11.3 oz)   LMP 2024   SpO2 (!) 95%   BMI 25.35 kg/m²     Gen: NAD  SVE: 3/70/-2  Membranes: ruptured for clear fluid during SVE    FHT: cat I (140/ mod/ + accels/ - decels)   [place]: q 2  Pit: 14 > halfed after SROM/AROM    A/P: 39 y.o. y/o  at 37w0d here for IOL iso gHTN     #Labor  - patient deciding about epidural   - /-3, miso x2 > pit (1700) > 50/-2 () > 3/70/-2, SROMc (2330)   - reSVE in 4-6h or sooner PRN   - continue to increase pitocin     #Fetal wellbeing  - Cat I   -Last growth (10/21): EFW 83%, AC 87%, HC 31%    # gHTN  - PEC labs: plt 162, AST/ALT , Cr 0.62  - few mild range BPS here  - no PEC sx    Roxann Dotosn MD    "

## 2024-12-08 NOTE — ANESTHESIA POSTPROCEDURE EVALUATION
"Patient: Nancy Contreras    Procedure Summary       Date: 12/07/24 Room / Location:     Anesthesia Start: 2248 Anesthesia Stop: 12/08/24 0344    Procedure: Labor Analgesia Diagnosis:     Scheduled Providers:  Responsible Provider: ERIK Zhou    Anesthesia Type: epidural ASA Status: 2            Anesthesia Type: epidural    /74   Pulse 84   Temp 36.7 °C (98.1 °F) (Oral)   Resp 16   Ht 1.626 m (5' 4\")   Wt 67 kg (147 lb 11.3 oz)   LMP 03/23/2024   SpO2 98%   Breastfeeding Yes   BMI 25.35 kg/m²       Anesthesia Post Evaluation    Patient location during evaluation: bedside  Patient participation: complete - patient participated  Level of consciousness: awake and alert  Pain score: 2  Pain management: adequate  Multimodal analgesia pain management approach  Airway patency: patent  Cardiovascular status: acceptable  Respiratory status: acceptable  Hydration status: acceptable  Postoperative Nausea and Vomiting: none  Comments: Epidural catheter removed by nursing. No redness, swelling, or drainage at puncture site.    Complete resolution of numbness. Patient is able to lift legs, bend at the knees, and ambulate.    Patient denies problems with urination.    Patient denies headache or severe back pain.       No notable events documented.    "

## 2024-12-08 NOTE — LACTATION NOTE
"Lactation Consultant Note  Lactation Consultation  Reason for Consult: Initial assessment  Consultant Name: Amena Newton    Maternal Information  Has mother  before?: Yes  How long did the mother previously breastfeed?: Older child age 2.5 years, BF x 1 year with supplementation  Previous Maternal Breastfeeding Challenges: Low milk supply  Infant to breast within first 2 hours of birth?: Yes  Exclusive Pump and Bottle Feed: No    Maternal Assessment  Breast Assessment: Medium, Pendulous, Soft  Nipple Assessment: Intact, Erect, Rounded after feeding  Areola Assessment: Normal    Infant Assessment  Infant Behavior: Awake, Sucking  Infant Assessment: Tongue protrudes over alveolar ridge    Feeding Assessment  Nutrition Source: Breastmilk  Feeding Method: Nursing at the breast  Feeding Position: Football/seated, Mother demonstrates good positioning  Suck/Feeding: Sustained, Baby led rhythmically, Audible swallowing  Latch Assessment: Instructed on deep latch, Wide open mouth < 160, Bursts of sucking, swallowing, and rest, Flanged lips, Comfortable latch    LATCH TOOL  Latch: Grasps breast, tongue down, lips flanged, rhythmic sucking  Audible Swallowing: Spontaneous and intermittent (24 hours old)  Type of Nipple: Everted (After stimulation)  Comfort (Breast/Nipple): Soft/non-tender  Hold (Positioning): No assist from staff, mother able to position/hold infant  LATCH Score: 10    Breast Pump  Pump: None    Other OB Lactation Tools       Patient Follow-up  Inpatient Lactation Follow-up Needed : Yes  Outpatient Lactation Follow-up: Recommended    Other OB Lactation Documentation  Maternal Risk Factors: Hypertension, Other (comment) (Advanced maternal age)  Infant Risk Factors: Early term birth 37-39 weeks    Recommendations/Summary  , 37.1 weeks,  on @0344. Birthweight 3180g. TCB 2.4@4 hours. Mother experienced, reports breastfeeding first child (age 2.5) for 1 year. Endorses low supply, \"topped off\" " Lots of fluids, take Tylenol 4 times daily, use Chloraseptic spray for sore throat, take Motrin 4 times daily if needed for sore throat, we will call you if any bacteria grow on the throat culture.    Thank you for choosing [facility] for your healthcare needs.    We strive to provide you with excellent service and hope that we have exceeded your expectations.     If you receive a survey in the mail, we hope that you will complete it and agree that we have provided \"Very Good\" care today.  If you would like to speak to us about your visit, please contact our center at:    [facility]  [phone]    [facility]  [phone]    [facility]  [phone]     with formula. Stated goal is exclusive breastfeeding. Reports this infant latching well. Latched at time of consult in football hold. Wide gape, flanged lips, bursts of sucking, swallowing, and rest. Mother endorses comfortable latch. Tension noted on areola, infant not tucked in close. Assisted with pillow support/towel roll to tuck infant closer. Mother reports more comfortable.     Educated parents on skin to skin, hand expression, hunger cues and feeding frequency/patterns. Discussed expected output, weight loss <10%, and normal bilirubin. Educated on AAP pacifier recommendations. Reviewed outpatient resources.  Encouraged to call out with any questions or concerns.

## 2024-12-08 NOTE — PROGRESS NOTES
"Labor Progress Note/ Assuming Care    Nancy Contreras is a 39 y.o.  at 37w0d who is here for IOL for gHTN.     S: Patient comfortable, reporting some discomfort with ctx - deciding if desires epidural now vs later.  No sx of PEC     O:  /79   Pulse 88   Temp 36.6 °C (97.9 °F) (Oral)   Resp 16   Ht 1.626 m (5' 4\")   Wt 67 kg (147 lb 11.3 oz)   LMP 2024   SpO2 97%   BMI 25.35 kg/m²     Gen: NAD  SVE: /-2  Membranes: intact    FHT: cat I (140/ mod/ + accels/ - decels)   [place]: q 2-4    A/P: 39 y.o. y/o  at 37w0d here for IOL iso gHTN     #Labor  - patient deciding about epidural   - /-3, miso x2 > pit () > 50/-2 ()   - recommend reSVE in 4-6h > discussed could sweep membranes +/- AROM if she gets epidural sooner (currently too uncomfortable during exam)    #Fetal wellbeing  - Cat I   -Last growth (10/21): EFW 83%, AC 87%, HC 31%    # gHTN  - PEC labs: plt 162, AST/ALT , Cr 0.62  - normotensive here  - no PEC sx    Roxann Dotson MD    "

## 2024-12-08 NOTE — L&D DELIVERY NOTE
OB Delivery Note  2024  Nancy Contreras  39 y.o.   Vaginal, Spontaneous       Gestational Age: 37w1d  /Para:   Quantitative Blood Loss: Admission to Discharge: 423 mL (2024  7:36 AM - 2024  7:56 AM)    Lilliam Contreras [[id number]]      Labor Events    Rupture date/time: 2024 2332  Rupture type: Spontaneous  Fluid color: Clear  Fluid odor: None  Labor type: Induced Onset of Labor  Labor allowed to proceed with plans for an attempted vaginal birth?: Yes  Induction: Misoprostol, Oxytocin  Induction indications: Hypertensive Disorder of Pregnancy  Complications: None       Labor Length    3rd stage: 0h 04m       Placenta    Placenta delivery date/time: 2024 0348  Placenta removal: Spontaneous  Placenta appearance: Intact  Placenta disposition: discarded       Cord    Vessels: 3 vessels  Complications: None  Delayed cord clamping?: Yes  Cord blood disposition: Discarded  Gases sent?: No  Stem cell collection (by provider): No       Lacerations    Episiotomy: None  Perineal laceration: 2nd  Perineal laceration repaired?: Yes  Other lacerations?: No  Repair suture: 2-0 Synthetic Suture       Anesthesia    Method: Epidural       Operative Delivery    Forceps attempted?: No  Vacuum extractor attempted?: No       Shoulder Dystocia    Shoulder dystocia present?: No       [place] Delivery    Birth date/time: 2024 03:44:00  Delivery type: Vaginal, Spontaneous  Complications: None       Resuscitation    Method: None       Apgars    Living status: Living  Apgar Component Scores:  1 min.:  5 min.:  10 min.:  15 min.:  20 min.:    Skin color:  1  1       Heart rate:  2  2       Reflex irritability:  2  2       Muscle tone:  2  2       Respiratory effort:  2  2       Total:  9  9       Apgars assigned by: CARLOS ALBERTO CHAVES       Delivery Providers    Delivering clinician: Roxann Ryan MD   Provider Role    Nancy Garland RN Delivery Nurse    Neela Nugent, RN Nursery Nurse     Resident                    Roxann Tim MD

## 2024-12-08 NOTE — CARE PLAN
The patient's goals for the shift include bond with baby    The clinical goals for the shift include return to prepregnant state      Problem: Vaginal Birth or  Section  Goal: Fetal and maternal status remain reassuring during the birth process  Outcome: Met  Flowsheets (Taken 2024 by Korinne Kathryn Becks, RN)  Fetal and maternal status remain reassuring during the birth process:   Monitor vital signs   Monitor labor progression (Vaginal delivery)   Deep vein thrombosis prophylaxis   Monitor fetal heart rate   Monitor uterine activity   Med administration/monitoring of effect  Goal: Demonstrates labor coping techniques through delivery  Outcome: Met  Flowsheets (Taken 2024 by Korinne Kathryn Becks, RN)  Demonstrates labor coping techniques through delivery:   Positioning, turning, and/or use of birthing ball assistance   Breathing/relaxation assistance  Goal: Minimal s/sx of HDP and BP<160/110  Outcome: Met  Flowsheets (Taken 2024 by Korinne Kathryn Becks, RN)  Minimal s/sx of HDP and BP <160/110:   Med administration/monitoring of effect   Monitor QBL and vital signs     Problem: Hypertensive Disorder of Pregnancy (HDP)  Goal: Minimal s/sx of HDP and BP<160/110  Outcome: Met     Problem: Pain - Adult  Goal: Verbalizes/displays adequate comfort level or baseline comfort level  Outcome: Progressing     Problem: Safety - Adult  Goal: Free from fall injury  Outcome: Progressing  Flowsheets (Taken 2024 by Korinne Kathryn Becks, RN)  Free from fall injury:   Instruct family/caregiver on patient safety   Based on caregiver fall risk screen, instruct family/caregiver to ask for assistance with transferring infant if caregiver noted to have fall risk factors     Problem: Discharge Planning  Goal: Discharge to home or other facility with appropriate resources  Outcome: Progressing  Flowsheets (Taken 2024 by Korinne Kathryn Becks, RN)  Discharge to home or other  facility with appropriate resources:   Identify barriers to discharge with patient and caregiver   Arrange for needed discharge resources and transportation as appropriate   Identify discharge learning needs (meds, wound care, etc)   Arrange for interpreters to assist at discharge as needed   Refer to discharge planning if patient needs post-hospital services based on physician order or complex needs related to functional status, cognitive ability or social support system

## 2024-12-08 NOTE — ANESTHESIA PROCEDURE NOTES
Epidural Block    Patient location during procedure: OB  Start time: 12/7/2024 10:48 PM  End time: 12/7/2024 10:55 PM  Reason for block: labor analgesia  Staffing  Performed: CRNA   Authorized by: ERIK Zhou    Performed by: ERIK Zhou    Preanesthetic Checklist  Completed: patient identified, IV checked, risks and benefits discussed, surgical consent, monitors and equipment checked, pre-op evaluation, timeout performed and sterile techniques followed  Block Timeout  RN/Licensed healthcare professional reads aloud to the Anesthesia provider and entire team: Patient identity, procedure with side and site, patient position, and as applicable the availability of implants/special equipment/special requirements.  Patient on coagulant treatment: no  Timeout performed at: 12/7/2024 10:50 PM  Block Placement  Patient position: sitting  Prep: ChloraPrep  Sterility prep: cap, drape, gloves, hand and mask  Sedation level: no sedation  Patient monitoring: blood pressure, continuous pulse oximetry and heart rate  Approach: midline  Local numbing: lidocaine 1% to skin and subcutaneous tissues  Vertebral space: lumbar  Lumbar location: L3-L4  Epidural  Loss of resistance technique: saline  Guidance: landmark technique        Needle  Needle type: Tuohy   Needle gauge: 17  Needle length: 8.9cm  Needle insertion depth: 3.5 cm  Catheter type: end hole  Catheter size: 19 G  Catheter at skin depth: 10 cm  Catheter securement method: clear occlusive dressing    Test dose: lidocaine 1.5% with epinephrine 1-to-200,000  Test dose: lidocaine 1.5% with epinephrine 1-to-200,000  Test dose result: no positive test dose    PCEA  Medication concentration used: 0.2% Ropivacaine with 2 mcg/mL Fentanyl  Dose (mL): 5  Lockout (minutes): 20  1-Hour Limit (boluses/hr): 3  Basal Rate: 8        Assessment bilateral  Block outcome: pain improved  Number of attempts: 1  Events: no positive test dose and No adverse events;  Negative CSF/heme/paresthesias  Procedure assessment: patient tolerated procedure well with no immediate complications

## 2024-12-08 NOTE — CARE PLAN
The patient's goals for the shift include bond with baby    The clinical goals for the shift include return to prepregnant state      Problem: Postpartum  Goal: Experiences normal postpartum course  Outcome: Progressing  Flowsheets (Taken 2024)  Experiences normal postpartum course:   Monitor maternal vital signs   Assess uterine involution  Goal: Appropriate maternal -  bonding  Outcome: Progressing  Flowsheets (Taken 2024)  Appropriate maternal- bonding:   Identify family support   24-hour rooming in  Goal: Establish and maintain infant feeding pattern for adequate nutrition  Outcome: Progressing  Flowsheets (Taken 2024)  Establish and maintain infant feeding pattern for adequate nutrition: Assess  human milk feeding  Goal: Incisions, wounds, or drain sites healing without S/S of infection  Outcome: Progressing  Goal: No s/sx infection  Outcome: Progressing  Goal: No s/sx of hemorrhage  Outcome: Progressing  Goal: Minimal s/sx of HDP and BP<160/110  Outcome: Progressing

## 2024-12-09 ENCOUNTER — APPOINTMENT (OUTPATIENT)
Dept: OBSTETRICS AND GYNECOLOGY | Facility: CLINIC | Age: 39
End: 2024-12-09
Payer: COMMERCIAL

## 2024-12-09 PROCEDURE — 2500000001 HC RX 250 WO HCPCS SELF ADMINISTERED DRUGS (ALT 637 FOR MEDICARE OP): Performed by: STUDENT IN AN ORGANIZED HEALTH CARE EDUCATION/TRAINING PROGRAM

## 2024-12-09 NOTE — CARE PLAN
The patient's goals for the shift include 24 hr discharge    The clinical goals for the shift include stable vs    Over the shift, the patient did  make progress.  Discharged at 5 am

## 2024-12-09 NOTE — DISCHARGE SUMMARY
Discharge Summary    Admission Date: 12/7/2024  Discharge Date: 12/9/24    Discharge Diagnosis  Gestational hypertension, third trimester ([facility])  Pt is requesting home this am bec  has to work   Encouraged stay to follow BPs  but she declines. Will get her own BP cuff.   Aware of sx Preeclampsia and when to call     She is doing well with ambulation, voiding, and tolerating a regular diet.      Hospital Course  Delivery Date: 12/8/2024 3:44 AM  Delivery type: Vaginal, Spontaneous   GA at delivery: 37w1d  Outcome: Living  Anesthesia during delivery: Epidural  Intrapartum complications: None  Feeding method: Breastfeeding Status: Yes       Pertinent Physical Exam At Time of Discharge    General: Examination reveals a well developed, well nourished, female, in no acute distress. She is alert and cooperative.  Abdomen: soft, gravid, nontender, nondistended, no abnormal masses, no epigastric pain.  Extremities: no redness or tenderness in the calves or thighs, no edema.    Last Vitals:  Temp Pulse Resp BP MAP Pulse Ox   37 °C (98.6 °F) 73 13 122/79 94  (unable to get pulse ox-nails painted on hands and feet)     Discharge Meds     Your medication list        ASK your doctor about these medications        Instructions Last Dose Given Next Dose Due   aspirin 81 mg EC tablet      Take 2 tablets (162 mg) by mouth once daily. Start taking at 12 weeks and continue until you have your baby       doxylamine 25 mg tablet  Commonly known as: Unisom (doxylamine)      Take 1 tablet (25 mg) by mouth as needed at bedtime for sleep or nausea.       PRENATAL FORMULA ORAL                     Complications Requiring Follow-Up  [place]   To call office this week for eval     Test Results Pending At Discharge  Pending Labs       No current pending labs.            Outpatient Follow-Up  Future Appointments   Date Time Provider Department Center   12/17/2024 12:40 PM Aliza Franklin MD [facility]   12/26/2024 12:40 PM Aliza  MD [facility]   12/30/2024 12:40 PM Aliza Franklin MD [facility]         Cyndi Mcintyre MD

## 2024-12-09 NOTE — NURSING NOTE
Pt getting dressed for discharge-baby id bands verified with parents-vss-saline lock removed-bandaid on-discharge instructions reviewed and copies given  pt aware of follow up appt-vss

## 2024-12-09 NOTE — NURSING NOTE
Pt wanting to go home by 5 am-Dr Mcintyre in to talk to pt-Dr Khalil examined baby during 24 hr testing=teaching completed and copies given-questions answered=vss

## 2024-12-10 ASSESSMENT — ENCOUNTER SYMPTOMS
HEADACHES: 0
PALPITATIONS: 0
NECK PAIN: 0
SWEATS: 0
SHORTNESS OF BREATH: 0
BLURRED VISION: 0
HYPERTENSION: 1
PND: 0
ORTHOPNEA: 0

## 2024-12-12 ENCOUNTER — APPOINTMENT (OUTPATIENT)
Dept: OBSTETRICS AND GYNECOLOGY | Facility: CLINIC | Age: 39
End: 2024-12-12
Payer: COMMERCIAL

## 2024-12-13 ENCOUNTER — TELEMEDICINE (OUTPATIENT)
Dept: OBSTETRICS AND GYNECOLOGY | Facility: CLINIC | Age: 39
End: 2024-12-13
Payer: COMMERCIAL

## 2024-12-13 DIAGNOSIS — Z86.711 HISTORY OF PULMONARY EMBOLISM: ICD-10-CM

## 2024-12-13 PROBLEM — O09.529 ANTEPARTUM MULTIGRAVIDA OF ADVANCED MATERNAL AGE (HHS-HCC): Status: RESOLVED | Noted: 2024-05-06 | Resolved: 2024-12-13

## 2024-12-13 PROBLEM — O13.3 GESTATIONAL HYPERTENSION, THIRD TRIMESTER (HHS-HCC): Status: RESOLVED | Noted: 2024-12-03 | Resolved: 2024-12-13

## 2024-12-13 PROBLEM — R73.9 ELEVATED BLOOD SUGAR: Status: RESOLVED | Noted: 2024-06-09 | Resolved: 2024-12-13

## 2024-12-13 PROBLEM — Z3A.36 36 WEEKS GESTATION OF PREGNANCY (HHS-HCC): Status: RESOLVED | Noted: 2024-05-06 | Resolved: 2024-12-13

## 2024-12-13 PROCEDURE — 99213 OFFICE O/P EST LOW 20 MIN: CPT | Performed by: STUDENT IN AN ORGANIZED HEALTH CARE EDUCATION/TRAINING PROGRAM

## 2024-12-13 PROCEDURE — 1036F TOBACCO NON-USER: CPT | Performed by: STUDENT IN AN ORGANIZED HEALTH CARE EDUCATION/TRAINING PROGRAM

## 2024-12-13 NOTE — PROGRESS NOTES
Subjective:  Nancy Contreras is a 39 y.o.  now PPD#5 presenting for virtual visit for BP check. She is overall doing well at home. Lochia light. Baby is breast feeding and doing well. Mood good. She has gHTN not on meds. BPs at home 120s/70s. She denies HA, scotoma, SOB, or RUQ pain.     Objective:  This was a virtual visit    Assessment and Plan:  Nancy Contreras is a  now PPD#5 presenting for virtual visit for BP check.     gHTN  -No meds  -BPs at home normotensive  -To continue to check BID and if normal at 2 weeks can stop     Postpartum  -Feeding: breast  -Contraception: interval Mirena, pelvic rest  -Mood: stable    History of PE  -While on OCPs in   -Declined anticoagulation during pregnancy  -Previously discussed 6 weeks of postpartum lovenox. Discussed again today, declines after counseling. Warning si/sx of VTE reviewed     RTC in 5 weeks for postpartum visit    Aliza Franklin MD

## 2024-12-17 ENCOUNTER — APPOINTMENT (OUTPATIENT)
Dept: OBSTETRICS AND GYNECOLOGY | Facility: CLINIC | Age: 39
End: 2024-12-17
Payer: COMMERCIAL

## 2024-12-26 ENCOUNTER — APPOINTMENT (OUTPATIENT)
Dept: OBSTETRICS AND GYNECOLOGY | Facility: CLINIC | Age: 39
End: 2024-12-26
Payer: COMMERCIAL

## 2024-12-30 ENCOUNTER — APPOINTMENT (OUTPATIENT)
Dept: OBSTETRICS AND GYNECOLOGY | Facility: CLINIC | Age: 39
End: 2024-12-30
Payer: COMMERCIAL

## 2025-01-21 ENCOUNTER — APPOINTMENT (OUTPATIENT)
Dept: OBSTETRICS AND GYNECOLOGY | Facility: CLINIC | Age: 40
End: 2025-01-21
Payer: COMMERCIAL

## 2025-01-21 VITALS
SYSTOLIC BLOOD PRESSURE: 125 MMHG | DIASTOLIC BLOOD PRESSURE: 73 MMHG | BODY MASS INDEX: 21.51 KG/M2 | HEIGHT: 64 IN | WEIGHT: 126 LBS

## 2025-01-21 DIAGNOSIS — N93.9 ABNORMAL UTERINE BLEEDING (AUB): ICD-10-CM

## 2025-01-21 DIAGNOSIS — Z30.430 ENCOUNTER FOR INSERTION OF MIRENA IUD: ICD-10-CM

## 2025-01-21 LAB
B-HCG SERPL-ACNC: <2 MIU/ML
PREGNANCY TEST URINE, POC: POSITIVE

## 2025-01-21 PROCEDURE — 84702 CHORIONIC GONADOTROPIN TEST: CPT

## 2025-01-21 ASSESSMENT — EDINBURGH POSTNATAL DEPRESSION SCALE (EPDS)
I HAVE BEEN ABLE TO LAUGH AND SEE THE FUNNY SIDE OF THINGS: AS MUCH AS I ALWAYS COULD
I HAVE BEEN SO UNHAPPY THAT I HAVE HAD DIFFICULTY SLEEPING: NOT VERY OFTEN
THINGS HAVE BEEN GETTING ON TOP OF ME: YES, SOMETIMES I HAVEN'T BEEN COPING AS WELL AS USUAL
THE THOUGHT OF HARMING MYSELF HAS OCCURRED TO ME: NEVER
I HAVE FELT SAD OR MISERABLE: NOT VERY OFTEN
I HAVE BEEN SO UNHAPPY THAT I HAVE BEEN CRYING: ONLY OCCASIONALLY
I HAVE BLAMED MYSELF UNNECESSARILY WHEN THINGS WENT WRONG: YES, SOME OF THE TIME
I HAVE BEEN ANXIOUS OR WORRIED FOR NO GOOD REASON: YES, SOMETIMES
TOTAL SCORE: 11
I HAVE LOOKED FORWARD WITH ENJOYMENT TO THINGS: AS MUCH AS I EVER DID
I HAVE FELT SCARED OR PANICKY FOR NO GOOD REASON: YES, SOMETIMES

## 2025-01-21 NOTE — PROGRESS NOTES
Subjective:  Nancy Contreras is a female 39 y.o. year old  who delivered vaginally at 37w1d after IOL for gHTN presenting for 6 week postpartum visit. Overall, she feels well. She is able to perform daily tasks without difficulty. Lochia ceased. She has not had a period since that time. She has not resumed intercourse and plans Mirena for contraception. She feels well-supported at home. She is currently breast feeding and baby is doing well. Mood overall good, occasionally tearful which is different from last postpartum but overall feels like she is coping. BPs at home normotensive, has since stopped checking.    Objective:  Vitals:    25 1248   BP: 125/73   Gen: awake, alert  Head: NCAT  HEENT: moist mucus membranes  Pulm: breathing comfortably on room air  CV: warm and well-perfused  : no blood or discharge in vault, no sutures appreciated, no pain over pelvic floor  Neuro: alert and oriented  Psych: appropriate affect     Assessment and Plan:  Nancy Contreras is a female 39 y.o. year old  who delivered vaginally at 37w1d after IOL for gHTN presenting for 6 week postpartum visit, doing well.    Postpartum  -Postpartum care: may resume normal activities including exercise, work, intercourse  -Feeding: breast   -Mood: EPDS 11, denies SI. Overall feels like she is coping. Did discuss therapy and think she would benefit, referred to [facility]  -Pap: NIL/HRHPV neg (2021), next due in     Contraception  -Desires Mirena IUD  -Has not had any intercourse since delivery. UPT faintly positive in the office. Suspect false positive however serum hCG drawn and sent. Will await result prior to placing IUD    gHTN  -No meds  -BPs at home normotensive  -BP today 125/73  -Now resolved     RTC in 1 week for IUD placement pending result of serum hCG    Aliza Franklin MD

## 2025-01-27 ENCOUNTER — APPOINTMENT (OUTPATIENT)
Dept: OBSTETRICS AND GYNECOLOGY | Facility: CLINIC | Age: 40
End: 2025-01-27
Payer: COMMERCIAL

## 2025-01-27 VITALS
BODY MASS INDEX: 21.61 KG/M2 | WEIGHT: 126.6 LBS | HEIGHT: 64 IN | DIASTOLIC BLOOD PRESSURE: 80 MMHG | SYSTOLIC BLOOD PRESSURE: 117 MMHG

## 2025-01-27 DIAGNOSIS — N93.9 ABNORMAL UTERINE BLEEDING (AUB): ICD-10-CM

## 2025-01-27 DIAGNOSIS — Z30.430 ENCOUNTER FOR IUD INSERTION: Primary | ICD-10-CM

## 2025-01-27 PROCEDURE — 58300 INSERT INTRAUTERINE DEVICE: CPT | Performed by: STUDENT IN AN ORGANIZED HEALTH CARE EDUCATION/TRAINING PROGRAM

## 2025-01-27 RX ORDER — LEVONORGESTREL 52 MG/1
1 INTRAUTERINE DEVICE INTRAUTERINE ONCE
COMMUNITY
Start: 2025-01-27

## 2025-01-27 NOTE — PROGRESS NOTES
Subjective   Patient ID [id number]   Nancy Contreras is a 39 y.o.  who presents today for Mirena IUD insertion. She is 7 weeks postpartum from [facility] She has not had intercourse since delivery. UPT at postpartum visit faintly positive -> serum hCG negative. Consistent with false positive UPT. Here today for Mirena IUD insertion.     Objective   Physical Exam  Vitals:    25 0851   BP: 117/80      Gen: awake, alert  Head: NCAT  HEENT: moist mucus membranes  Pulm: breathing comfortably on room air  CV: warm and well-perfused  Neuro: alert and oriented  Psych: appropriate affect     IUD Insertion    Performed by: Aliza Franklin MD  Authorized by: Aliza Franklin MD    Procedure: IUD insertion    Consent obtained by patient, parent, or legal power of  - including discussion of procedure risks and benefits, patient questions answered, and patient education provided: yes    Pregnancy risk: reasonably certain the patient is not pregnant    Pre-Medications:  Ibuprofen  Date/Time of Insertion:  2025 9:22 AM  Immediately prior to procedure a time out was called: yes    Pelvic exam performed: yes    Speculum placed in vagina: yes    Cervix cleaned and prepped: yes    Tenaculum/Allis/Ring Forceps applied to cervix: yes    Anesthesia used: no    Uterus sound depth (cm):  9  Cervix manually dilated: no    IUD inserted without complications: yes    OSM: levonorgestrel 20 mcg/24hr  Strings trimmed to (cm):  2  Patient tolerated procedure well: yes    Inserted with ultrasound guidance: no    Transvaginal sono confirmed fundal placement: no    Estimated blood loss (mL):  5  Intended removal date: 8 years        Assessment/Plan     Nancy Contreras is a 39 y.o.  who is 7 weeks postpartum from [facility] presenting today for Mirena IUD insertion. Has not had intercourse since delivery. UPT faintly positive at 6 week postpartum visit. Follow-up serum hCG negative. Suspect UPT false positive. Not resent today as she  has not had intercourse. Pregnancy reasonably excluded. Discussed efficacy, risk of ectopic, risk of expulsion, anticipated bleeding pattern, and duration of use. Reviewed risks of insertion including pain, bleeding, infection, and uterine perforation requiring surgical removal. Consent signed. IUD inserted today without complication     Follow up in 1 month for string check.    Aliza Franklin MD

## 2025-03-03 ENCOUNTER — APPOINTMENT (OUTPATIENT)
Dept: OBSTETRICS AND GYNECOLOGY | Facility: CLINIC | Age: 40
End: 2025-03-03
Payer: COMMERCIAL

## 2025-03-03 VITALS
BODY MASS INDEX: 21.51 KG/M2 | HEIGHT: 64 IN | SYSTOLIC BLOOD PRESSURE: 116 MMHG | DIASTOLIC BLOOD PRESSURE: 82 MMHG | WEIGHT: 126 LBS

## 2025-03-03 DIAGNOSIS — Z30.431 IUD CHECK UP: Primary | ICD-10-CM

## 2025-03-03 PROCEDURE — 3008F BODY MASS INDEX DOCD: CPT | Performed by: STUDENT IN AN ORGANIZED HEALTH CARE EDUCATION/TRAINING PROGRAM

## 2025-03-03 PROCEDURE — 1036F TOBACCO NON-USER: CPT | Performed by: STUDENT IN AN ORGANIZED HEALTH CARE EDUCATION/TRAINING PROGRAM

## 2025-03-03 PROCEDURE — 99213 OFFICE O/P EST LOW 20 MIN: CPT | Performed by: STUDENT IN AN ORGANIZED HEALTH CARE EDUCATION/TRAINING PROGRAM

## 2025-03-03 NOTE — PROGRESS NOTES
Subjective   Patient ID [id number]   Nancy Contreras is a 39 y.o.  who presents today for IUD check. Mirena IUD inserted 5 weeks ago. Had some cramping after insertion that resolved. No bleeding. She is breast feeding.    Objective   Physical Exam  Vitals:    25 0913   BP: 116/82      Gen: awake, alert  Head: NCAT  HEENT: moist mucus membranes  Pulm: breathing comfortably on room air  CV: warm and well-perfused  : IUD strings visible at os   Neuro: alert and oriented  Psych: appropriate affect     Assessment/Plan     Nancy Contreras is a 39 y.o.  who presents today for IUD check.     Mirena IUD  -Inserted 25  -Strings visualized on exam today  -Happy with method    Follow up in 1 year for annual exam and pap.    Aliza Franklin MD

## 2026-03-09 ENCOUNTER — APPOINTMENT (OUTPATIENT)
Dept: OBSTETRICS AND GYNECOLOGY | Facility: CLINIC | Age: 41
End: 2026-03-09
Payer: COMMERCIAL